# Patient Record
Sex: MALE | Race: WHITE | NOT HISPANIC OR LATINO | Employment: OTHER | URBAN - METROPOLITAN AREA
[De-identification: names, ages, dates, MRNs, and addresses within clinical notes are randomized per-mention and may not be internally consistent; named-entity substitution may affect disease eponyms.]

---

## 2017-01-03 RX ORDER — SIMVASTATIN 20 MG
TABLET ORAL
Qty: 90 TABLET | Refills: 1 | Status: SHIPPED | OUTPATIENT
Start: 2017-01-03 | End: 2017-08-21 | Stop reason: SDUPTHER

## 2017-01-03 RX ORDER — FINASTERIDE 5 MG/1
TABLET, FILM COATED ORAL
Qty: 90 TABLET | Refills: 1 | Status: SHIPPED | OUTPATIENT
Start: 2017-01-03 | End: 2017-08-21 | Stop reason: SDUPTHER

## 2017-04-24 DIAGNOSIS — K21.9 GASTROESOPHAGEAL REFLUX DISEASE, ESOPHAGITIS PRESENCE NOT SPECIFIED: ICD-10-CM

## 2017-04-24 DIAGNOSIS — E78.2 MIXED HYPERLIPIDEMIA: Primary | ICD-10-CM

## 2017-04-24 DIAGNOSIS — I10 BENIGN ESSENTIAL HYPERTENSION: Primary | ICD-10-CM

## 2017-04-24 RX ORDER — OMEPRAZOLE 20 MG/1
CAPSULE, DELAYED RELEASE ORAL
Qty: 90 CAPSULE | Refills: 0 | Status: SHIPPED | OUTPATIENT
Start: 2017-04-24 | End: 2017-08-21 | Stop reason: SDUPTHER

## 2017-04-24 RX ORDER — OMEPRAZOLE 20 MG/1
CAPSULE, DELAYED RELEASE ORAL
Qty: 90 CAPSULE | Refills: 0 | Status: SHIPPED | OUTPATIENT
Start: 2017-04-24 | End: 2017-04-24 | Stop reason: SDUPTHER

## 2017-04-24 RX ORDER — FENOFIBRATE 145 MG/1
TABLET, COATED ORAL
Qty: 90 TABLET | Refills: 0 | Status: SHIPPED | OUTPATIENT
Start: 2017-04-24 | End: 2017-08-21 | Stop reason: SDUPTHER

## 2017-04-24 RX ORDER — HYDROCHLOROTHIAZIDE 25 MG/1
TABLET ORAL
Qty: 90 TABLET | Refills: 0 | Status: SHIPPED | OUTPATIENT
Start: 2017-04-24 | End: 2018-12-11 | Stop reason: SDUPTHER

## 2017-06-08 ENCOUNTER — OFFICE VISIT (OUTPATIENT)
Dept: INTERNAL MEDICINE | Age: 67
End: 2017-06-08

## 2017-06-08 VITALS
DIASTOLIC BLOOD PRESSURE: 70 MMHG | HEART RATE: 72 BPM | WEIGHT: 170 LBS | SYSTOLIC BLOOD PRESSURE: 120 MMHG | BODY MASS INDEX: 26.68 KG/M2 | RESPIRATION RATE: 12 BRPM | HEIGHT: 67 IN

## 2017-06-08 DIAGNOSIS — I10 BENIGN ESSENTIAL HYPERTENSION: Primary | ICD-10-CM

## 2017-06-08 DIAGNOSIS — E78.2 MIXED HYPERLIPIDEMIA: ICD-10-CM

## 2017-06-08 LAB
ALBUMIN SERPL-MCNC: 4.6 G/DL (ref 3.5–5.2)
ALBUMIN/GLOB SERPL: 1.8 G/DL
ALP SERPL-CCNC: 32 U/L (ref 39–117)
ALT SERPL-CCNC: 23 U/L (ref 1–41)
AST SERPL-CCNC: 28 U/L (ref 1–40)
BILIRUB SERPL-MCNC: 0.3 MG/DL (ref 0.1–1.2)
BUN SERPL-MCNC: 21 MG/DL (ref 8–23)
BUN/CREAT SERPL: 16.2 (ref 7–25)
CALCIUM SERPL-MCNC: 10.6 MG/DL (ref 8.6–10.5)
CHLORIDE SERPL-SCNC: 101 MMOL/L (ref 98–107)
CHOLEST SERPL-MCNC: 164 MG/DL (ref 0–200)
CO2 SERPL-SCNC: 27.4 MMOL/L (ref 22–29)
CREAT SERPL-MCNC: 1.3 MG/DL (ref 0.76–1.27)
GLOBULIN SER CALC-MCNC: 2.6 GM/DL
GLUCOSE SERPL-MCNC: 102 MG/DL (ref 65–99)
HDLC SERPL-MCNC: 53 MG/DL (ref 40–60)
LDLC SERPL CALC-MCNC: 91 MG/DL (ref 0–100)
POTASSIUM SERPL-SCNC: 4.5 MMOL/L (ref 3.5–5.2)
PROT SERPL-MCNC: 7.2 G/DL (ref 6–8.5)
SODIUM SERPL-SCNC: 143 MMOL/L (ref 136–145)
TRIGL SERPL-MCNC: 102 MG/DL (ref 0–150)
VLDLC SERPL CALC-MCNC: 20.4 MG/DL (ref 5–40)

## 2017-06-08 PROCEDURE — 99214 OFFICE O/P EST MOD 30 MIN: CPT | Performed by: INTERNAL MEDICINE

## 2017-06-08 NOTE — PROGRESS NOTES
Juan F Sanz is a 67 y.o. male who presents with   Chief Complaint   Patient presents with   • Hypertension     Checkup; lab updates   • Hyperlipidemia     As above.  No complaints on today's visit.   .    Hypertension   This is a chronic problem. The current episode started more than 1 year ago. The problem is controlled. Past treatments include diuretics. The current treatment provides moderate improvement. There are no compliance problems.    Hyperlipidemia   This is a chronic problem. The current episode started more than 1 year ago. The problem is controlled. Recent lipid tests were reviewed and are normal. Current antihyperlipidemic treatment includes statins and fibric acid derivatives. The current treatment provides moderate improvement of lipids. There are no compliance problems.         The following portions of the patient's history were reviewed and updated as appropriate: allergies, current medications, past medical history and problem list.    Review of Systems   Constitutional: Negative.    HENT: Negative.    Eyes: Negative.    Respiratory: Negative.    Cardiovascular: Negative.    Genitourinary: Negative.    Musculoskeletal: Negative.    Skin: Negative.    Neurological: Negative.    Psychiatric/Behavioral: Negative.        Objective   Physical Exam   Constitutional: He is oriented to person, place, and time. He appears well-developed and well-nourished. No distress.   HENT:   Head: Normocephalic and atraumatic.   Eyes: Conjunctivae and EOM are normal. Pupils are equal, round, and reactive to light.   Neck: Normal range of motion. Neck supple. No thyromegaly present.   Neck exam negative.  Carotid auscultation normal-no bruits heard.   Cardiovascular: Normal rate, regular rhythm, normal heart sounds and intact distal pulses.  Exam reveals no gallop and no friction rub.    No murmur heard.  Pulmonary/Chest: Effort normal and breath sounds normal. No respiratory distress. He has no wheezes. He has  no rales. He exhibits no tenderness.   Neurological: He is alert and oriented to person, place, and time.   Psychiatric: He has a normal mood and affect. His behavior is normal. Judgment and thought content normal.   Nursing note and vitals reviewed.      Assessment/Plan   Juan F was seen today for hypertension and hyperlipidemia.    Diagnoses and all orders for this visit:    Benign essential hypertension  -     Comprehensive Metabolic Panel    Mixed hyperlipidemia  -     Comprehensive Metabolic Panel  -     Lipid Panel        Plan: Labs as above.Today's exam unremarkable for any new problems or events.  Continue all current treatment as prescribed.  A follow-up checkup/lab update visit is advised for 6 months.

## 2017-08-21 DIAGNOSIS — K21.9 GASTROESOPHAGEAL REFLUX DISEASE, ESOPHAGITIS PRESENCE NOT SPECIFIED: ICD-10-CM

## 2017-08-21 DIAGNOSIS — E78.2 MIXED HYPERLIPIDEMIA: ICD-10-CM

## 2017-08-21 RX ORDER — SIMVASTATIN 20 MG
TABLET ORAL
Qty: 90 TABLET | Refills: 1 | Status: SHIPPED | OUTPATIENT
Start: 2017-08-21 | End: 2018-03-06 | Stop reason: SDUPTHER

## 2017-08-21 RX ORDER — FINASTERIDE 5 MG/1
TABLET, FILM COATED ORAL
Qty: 90 TABLET | Refills: 1 | Status: SHIPPED | OUTPATIENT
Start: 2017-08-21 | End: 2018-03-06 | Stop reason: SDUPTHER

## 2017-08-21 RX ORDER — FENOFIBRATE 145 MG/1
TABLET, COATED ORAL
Qty: 90 TABLET | Refills: 1 | Status: SHIPPED | OUTPATIENT
Start: 2017-08-21 | End: 2018-03-06 | Stop reason: SDUPTHER

## 2017-08-21 RX ORDER — OMEPRAZOLE 20 MG/1
CAPSULE, DELAYED RELEASE ORAL
Qty: 90 CAPSULE | Refills: 0 | Status: SHIPPED | OUTPATIENT
Start: 2017-08-21 | End: 2017-10-31 | Stop reason: SDUPTHER

## 2017-10-05 ENCOUNTER — OFFICE VISIT (OUTPATIENT)
Dept: INTERNAL MEDICINE | Age: 67
End: 2017-10-05

## 2017-10-05 VITALS
WEIGHT: 165.4 LBS | TEMPERATURE: 98.3 F | DIASTOLIC BLOOD PRESSURE: 82 MMHG | BODY MASS INDEX: 25.96 KG/M2 | OXYGEN SATURATION: 99 % | HEART RATE: 66 BPM | HEIGHT: 67 IN | SYSTOLIC BLOOD PRESSURE: 132 MMHG

## 2017-10-05 DIAGNOSIS — R31.9 HEMATURIA, UNSPECIFIED TYPE: ICD-10-CM

## 2017-10-05 DIAGNOSIS — Z87.19 HISTORY OF DIVERTICULOSIS: ICD-10-CM

## 2017-10-05 DIAGNOSIS — R10.30 LOWER ABDOMINAL PAIN: Primary | ICD-10-CM

## 2017-10-05 LAB
BILIRUB BLD-MCNC: NEGATIVE MG/DL
CLARITY, POC: CLEAR
COLOR UR: YELLOW
GLUCOSE UR STRIP-MCNC: NEGATIVE MG/DL
KETONES UR QL: NEGATIVE
LEUKOCYTE EST, POC: NEGATIVE
NITRITE UR-MCNC: NEGATIVE MG/ML
PH UR: 5.5 [PH] (ref 5–8)
PROT UR STRIP-MCNC: NEGATIVE MG/DL
RBC # UR STRIP: ABNORMAL /UL
SP GR UR: 1.02 (ref 1–1.03)
UROBILINOGEN UR QL: NORMAL

## 2017-10-05 PROCEDURE — 81003 URINALYSIS AUTO W/O SCOPE: CPT | Performed by: NURSE PRACTITIONER

## 2017-10-05 PROCEDURE — 99214 OFFICE O/P EST MOD 30 MIN: CPT | Performed by: NURSE PRACTITIONER

## 2017-10-05 NOTE — PATIENT INSTRUCTIONS
Diverticulosis  Diverticulosis is the condition that develops when small pouches (diverticula) form in the wall of your colon. Your colon, or large intestine, is where water is absorbed and stool is formed. The pouches form when the inside layer of your colon pushes through weak spots in the outer layers of your colon.  CAUSES   No one knows exactly what causes diverticulosis.  RISK FACTORS  · Being older than 50. Your risk for this condition increases with age. Diverticulosis is rare in people younger than 40 years. By age 80, almost everyone has it.  · Eating a low-fiber diet.  · Being frequently constipated.  · Being overweight.  · Not getting enough exercise.  · Smoking.  · Taking over-the-counter pain medicines, like aspirin and ibuprofen.  SYMPTOMS   Most people with diverticulosis do not have symptoms.  DIAGNOSIS   Because diverticulosis often has no symptoms, health care providers often discover the condition during an exam for other colon problems. In many cases, a health care provider will diagnose diverticulosis while using a flexible scope to examine the colon (colonoscopy).  TREATMENT   If you have never developed an infection related to diverticulosis, you may not need treatment. If you have had an infection before, treatment may include:  · Eating more fruits, vegetables, and grains.  · Taking a fiber supplement.  · Taking a live bacteria supplement (probiotic).  · Taking medicine to relax your colon.  HOME CARE INSTRUCTIONS   · Drink at least 6-8 glasses of water each day to prevent constipation.  · Try not to strain when you have a bowel movement.  · Keep all follow-up appointments.  If you have had an infection before:   · Increase the fiber in your diet as directed by your health care provider or dietitian.  · Take a dietary fiber supplement if your health care provider approves.  · Only take medicines as directed by your health care provider.  SEEK MEDICAL CARE IF:   · You have abdominal  pain.  · You have bloating.  · You have cramps.  · You have not gone to the bathroom in 3 days.  SEEK IMMEDIATE MEDICAL CARE IF:   · Your pain gets worse.  · Your bloating becomes very bad.  · You have a fever or chills, and your symptoms suddenly get worse.  · You begin vomiting.  · You have bowel movements that are bloody or black.  MAKE SURE YOU:  · Understand these instructions.  · Will watch your condition.  · Will get help right away if you are not doing well or get worse.     This information is not intended to replace advice given to you by your health care provider. Make sure you discuss any questions you have with your health care provider.     Document Released: 09/14/2005 Document Revised: 12/23/2014 Document Reviewed: 11/12/2014  [employer]vier Interactive Patient Education ©2017 [employer]

## 2017-10-05 NOTE — PROGRESS NOTES
Subjective   Juan F Sanz is a 67 y.o. male.     Abdominal Pain   This is a new problem. The current episode started 1 to 4 weeks ago (had discomfort x 1 week with fever, returned 3 days ago). The problem occurs intermittently. The pain is located in the suprapubic region. The pain is at a severity of 2/10. The pain is mild. The quality of the pain is sharp. The abdominal pain does not radiate. Associated symptoms include nausea (mild). Pertinent negatives include no constipation (last BM this AM), diarrhea, dysuria, fever, flatus, frequency, hematochezia, hematuria, melena or vomiting. Nothing aggravates the pain. The pain is relieved by nothing. Treatments tried: NSAID. The treatment provided mild relief. His past medical history is significant for GERD. There is no history of abdominal surgery, colon cancer, gallstones, irritable bowel syndrome, PUD or ulcerative colitis. last colonoscopy (2013), noted to have hemorrhoids or diverticuli    Patient denies any urinary symptoms out of the ordinary, positive medical history for BPH, currently on Proscar.    The following portions of the patient's history were reviewed and updated as appropriate: allergies, current medications, past family history, past medical history, past social history, past surgical history and problem list.    Review of Systems   Constitutional: Negative for chills and fever.   Gastrointestinal: Positive for abdominal pain and nausea (mild). Negative for abdominal distention, blood in stool, constipation (last BM this AM), diarrhea, flatus, hematochezia, melena and vomiting.   Genitourinary: Negative for decreased urine volume, difficulty urinating, dysuria, enuresis, flank pain, frequency, hematuria, testicular pain and urgency.       Objective   Physical Exam   Constitutional: Vital signs are normal. He appears well-developed and well-nourished. He is cooperative. He does not appear ill. No distress.   Cardiovascular: Normal rate, regular  rhythm and normal heart sounds.    No murmur heard.  Pulmonary/Chest: Effort normal and breath sounds normal.   Abdominal: Soft. Normal appearance and bowel sounds are normal. He exhibits no distension. There is tenderness in the suprapubic area. There is no CVA tenderness.   Neurological: He is alert.   Skin: Skin is warm, dry and intact.   Psychiatric: He has a normal mood and affect. His speech is normal and behavior is normal. Judgment and thought content normal. Cognition and memory are normal.   Nursing note and vitals reviewed.      Assessment/Plan   Problems Addressed this Visit     None      Visit Diagnoses     Lower abdominal pain    -  Primary    Relevant Orders    POC Urinalysis Dipstick, Automated (Completed)    CT Abdomen Pelvis With Contrast    CBC & Differential    Comprehensive Metabolic Panel    Urine Culture - Urine, Urine, Clean Catch    History of diverticulosis        Relevant Orders    CT Abdomen Pelvis With Contrast    Hematuria, unspecified type        Relevant Orders    Urine Culture - Urine, Urine, Clean Catch        1. Lower abdominal pain  Patient with intermittent complaint of lower abdominal pain localized to the suprapubic area x a period of weeks.  The pain is associated with mild nausea, no changes in bowel movement pattern.  Patient does have a history of diverticulosis.  Point-of-care urinalysis nonremarkable other than small amount of hematuria.  I will obtain CT of abdomen and pelvis in addition to CBC and CMP to r/o diverticulosis, nephrolithiasis.      - POC Urinalysis Dipstick, Automated  - CT Abdomen Pelvis With Contrast  - CBC & Differential  - Comprehensive Metabolic Panel  - Urine Culture - Urine, Urine, Clean Catch    2. History of diverticulosis    - CT Abdomen Pelvis With Contrast    3. Hematuria, unspecified type    - Urine Culture - Urine, Urine, Clean Catch

## 2017-10-07 LAB
ALBUMIN SERPL-MCNC: 4.3 G/DL (ref 3.5–5.2)
ALBUMIN/GLOB SERPL: 1.7 G/DL
ALP SERPL-CCNC: 37 U/L (ref 39–117)
ALT SERPL-CCNC: 19 U/L (ref 1–41)
AST SERPL-CCNC: 34 U/L (ref 1–40)
BACTERIA UR CULT: NO GROWTH
BACTERIA UR CULT: NORMAL
BASOPHILS # BLD AUTO: 0.03 10*3/MM3 (ref 0–0.2)
BASOPHILS NFR BLD AUTO: 0.4 % (ref 0–1.5)
BILIRUB SERPL-MCNC: 0.8 MG/DL (ref 0.1–1.2)
BUN SERPL-MCNC: 20 MG/DL (ref 8–23)
BUN/CREAT SERPL: 15.4 (ref 7–25)
CALCIUM SERPL-MCNC: 10.1 MG/DL (ref 8.6–10.5)
CHLORIDE SERPL-SCNC: 101 MMOL/L (ref 98–107)
CO2 SERPL-SCNC: 27.6 MMOL/L (ref 22–29)
CREAT SERPL-MCNC: 1.3 MG/DL (ref 0.76–1.27)
EOSINOPHIL # BLD AUTO: 0.08 10*3/MM3 (ref 0–0.7)
EOSINOPHIL NFR BLD AUTO: 1 % (ref 0.3–6.2)
ERYTHROCYTE [DISTWIDTH] IN BLOOD BY AUTOMATED COUNT: 13 % (ref 11.5–14.5)
GLOBULIN SER CALC-MCNC: 2.6 GM/DL
GLUCOSE SERPL-MCNC: 97 MG/DL (ref 65–99)
HCT VFR BLD AUTO: 41.5 % (ref 40.4–52.2)
HGB BLD-MCNC: 13.2 G/DL (ref 13.7–17.6)
IMM GRANULOCYTES # BLD: 0 10*3/MM3 (ref 0–0.03)
IMM GRANULOCYTES NFR BLD: 0 % (ref 0–0.5)
LYMPHOCYTES # BLD AUTO: 1.07 10*3/MM3 (ref 0.9–4.8)
LYMPHOCYTES NFR BLD AUTO: 13.9 % (ref 19.6–45.3)
MCH RBC QN AUTO: 29.5 PG (ref 27–32.7)
MCHC RBC AUTO-ENTMCNC: 31.8 G/DL (ref 32.6–36.4)
MCV RBC AUTO: 92.6 FL (ref 79.8–96.2)
MONOCYTES # BLD AUTO: 0.56 10*3/MM3 (ref 0.2–1.2)
MONOCYTES NFR BLD AUTO: 7.3 % (ref 5–12)
NEUTROPHILS # BLD AUTO: 5.98 10*3/MM3 (ref 1.9–8.1)
NEUTROPHILS NFR BLD AUTO: 77.4 % (ref 42.7–76)
PLATELET # BLD AUTO: 259 10*3/MM3 (ref 140–500)
POTASSIUM SERPL-SCNC: 4.6 MMOL/L (ref 3.5–5.2)
PROT SERPL-MCNC: 6.9 G/DL (ref 6–8.5)
RBC # BLD AUTO: 4.48 10*6/MM3 (ref 4.6–6)
SODIUM SERPL-SCNC: 143 MMOL/L (ref 136–145)
WBC # BLD AUTO: 7.72 10*3/MM3 (ref 4.5–10.7)

## 2017-10-13 ENCOUNTER — HOSPITAL ENCOUNTER (OUTPATIENT)
Dept: CT IMAGING | Facility: HOSPITAL | Age: 67
Discharge: HOME | End: 2017-10-13
Admitting: NURSE PRACTITIONER

## 2017-10-13 ENCOUNTER — TELEPHONE (OUTPATIENT)
Dept: INTERNAL MEDICINE | Age: 67
End: 2017-10-13

## 2017-10-13 DIAGNOSIS — K22.89 ESOPHAGEAL THICKENING: ICD-10-CM

## 2017-10-13 DIAGNOSIS — R93.89 ABNORMAL FINDING ON CT SCAN: ICD-10-CM

## 2017-10-13 DIAGNOSIS — K57.92 ACUTE DIVERTICULITIS: Primary | ICD-10-CM

## 2017-10-13 DIAGNOSIS — N40.0 ENLARGED PROSTATE: ICD-10-CM

## 2017-10-13 LAB — CREAT BLDA-MCNC: 1.2 MG/DL (ref 0.6–1.3)

## 2017-10-13 PROCEDURE — 0 IOPAMIDOL 61 % SOLUTION: Performed by: NURSE PRACTITIONER

## 2017-10-13 PROCEDURE — 82565 ASSAY OF CREATININE: CPT

## 2017-10-13 PROCEDURE — 74177 CT ABD & PELVIS W/CONTRAST: CPT

## 2017-10-13 RX ORDER — CIPROFLOXACIN 500 MG/1
500 TABLET, FILM COATED ORAL 2 TIMES DAILY
Qty: 14 TABLET | Refills: 0 | Status: SHIPPED | OUTPATIENT
Start: 2017-10-13 | End: 2017-10-20

## 2017-10-13 RX ORDER — METRONIDAZOLE 500 MG/1
500 TABLET ORAL EVERY 6 HOURS
Qty: 28 TABLET | Refills: 0 | Status: SHIPPED | OUTPATIENT
Start: 2017-10-13 | End: 2017-10-20

## 2017-10-13 RX ADMIN — IOPAMIDOL 85 ML: 612 INJECTION, SOLUTION INTRAVENOUS at 11:15

## 2017-10-13 NOTE — TELEPHONE ENCOUNTER
I discussed results of abdominal CT scan with patient.  He reports abdominal pain has not worsened, still has some mild tenderness with palpation, denies any vomiting, diarrhea, fever, or severe pain.  Discussed with patient that it looks like he has some mild diverticulitis, appropriate to treat outpatient with course of antibiotics.  I also discussed incidental findings noted by the radiologist of the significantly enlarged prostate and the bladder wall thickening from likely urinary obstruction as well as the esophageal thickening.  He does have a history of BPH, currently on Pro scar per Dr. Acosta.  He does report he has seen a urologist in the past, although it has been some years (Dr. James Mcgregor).  He has also seen GI in the past, although it has been approximately 10 years or so since he has had endoscopic.  He does have a history of acid reflux.  Discussed with patient that at this time, per the radiologist's recommendations, I'm going to place referrals to both Dr. Torres with GI as well as his previous urologist.  He is also going to follow-up with Dr. Martinez next week.

## 2017-10-17 ENCOUNTER — OFFICE VISIT (OUTPATIENT)
Dept: INTERNAL MEDICINE | Age: 67
End: 2017-10-17

## 2017-10-17 VITALS
TEMPERATURE: 97.4 F | RESPIRATION RATE: 12 BRPM | HEART RATE: 70 BPM | SYSTOLIC BLOOD PRESSURE: 148 MMHG | HEIGHT: 67 IN | OXYGEN SATURATION: 96 % | WEIGHT: 168.2 LBS | DIASTOLIC BLOOD PRESSURE: 70 MMHG | BODY MASS INDEX: 26.4 KG/M2

## 2017-10-17 DIAGNOSIS — K57.32 DIVERTICULITIS OF LARGE INTESTINE WITHOUT PERFORATION OR ABSCESS WITHOUT BLEEDING: Primary | ICD-10-CM

## 2017-10-17 DIAGNOSIS — R35.1 BPH ASSOCIATED WITH NOCTURIA: ICD-10-CM

## 2017-10-17 DIAGNOSIS — I10 BENIGN ESSENTIAL HYPERTENSION: ICD-10-CM

## 2017-10-17 DIAGNOSIS — N40.1 BPH ASSOCIATED WITH NOCTURIA: ICD-10-CM

## 2017-10-17 PROBLEM — N40.0 BPH WITHOUT URINARY OBSTRUCTION: Status: ACTIVE | Noted: 2017-10-17

## 2017-10-17 PROCEDURE — 99214 OFFICE O/P EST MOD 30 MIN: CPT | Performed by: INTERNAL MEDICINE

## 2017-10-17 NOTE — PROGRESS NOTES
Juan F Sanz is a 67 y.o. male who presents with   Chief Complaint   Patient presents with   • Diverticulitis     Recently was seen by the nurse practitioner.  Treated with Cipro and Flagyl.  Returns today for follow-up to discuss recent CT of the abdomen and pelvis   • BPH with nocturia     As above.  Patient also having some dribbling with urination.  No fever, chills or dysuria.   • Hypertension     Check blood pressure.   .    Diverticulitis   This is a new problem. The current episode started 1 to 4 weeks ago. The problem has been rapidly improving. Pertinent negatives include no abdominal pain, chills, fever, nausea or vomiting. Treatments tried: Cipro/Flagyl as prescribed. The treatment provided moderate relief.   Hypertension   This is a chronic problem. The current episode started more than 1 year ago. The problem has been waxing and waning since onset. The problem is controlled. Past treatments include diuretics. The current treatment provides mild improvement. There are no compliance problems.       BPH: History as outlined above.  Recent CT of the abdomen/pelvis shows enlargement of the prostate.      The following portions of the patient's history were reviewed and updated as appropriate: allergies, current medications, past medical history and problem list.    Review of Systems   Constitutional: Negative.  Negative for chills and fever.   HENT: Negative.    Eyes: Negative.    Respiratory: Negative.    Cardiovascular: Negative.    Gastrointestinal: Negative for abdominal pain, nausea and vomiting.   Genitourinary: Positive for decreased urine volume and difficulty urinating.   Musculoskeletal: Negative.    Skin: Negative.    Neurological: Negative.    Psychiatric/Behavioral: Negative.        Objective   Physical Exam   Constitutional: He is oriented to person, place, and time. He appears well-developed and well-nourished. No distress.   HENT:   Head: Normocephalic and atraumatic.   Eyes:  Conjunctivae and EOM are normal. Pupils are equal, round, and reactive to light.   Neck: Normal range of motion. Neck supple. No thyromegaly present.   Neck exam negative.  Carotid auscultation normal-no bruits heard.   Cardiovascular: Normal rate, regular rhythm, normal heart sounds and intact distal pulses.  Exam reveals no gallop and no friction rub.    No murmur heard.  Pulmonary/Chest: Effort normal and breath sounds normal. No respiratory distress. He has no wheezes. He has no rales. He exhibits no tenderness.   Abdominal: Soft. Bowel sounds are normal. He exhibits no distension and no mass. There is no tenderness. There is no rebound and no guarding.   Genitourinary:   Genitourinary Comments:  exam deferred.  Will make referral to urology-Dr. Le.   Neurological: He is alert and oriented to person, place, and time.   Psychiatric: He has a normal mood and affect. His behavior is normal. Judgment and thought content normal.   Nursing note and vitals reviewed.      Assessment/Plan   Juan F was seen today for diverticulitis, bph with nocturia and hypertension.    Diagnoses and all orders for this visit:    Diverticulitis of large intestine without perforation or abscess without bleeding    BPH associated with nocturia  -     Ambulatory Referral to Urology    Benign essential hypertension      Plan:  #1 diverticulitis: CT scan of the abdomen/pelvis reviewed.  Findings compatible with diverticulitis of the sigmoid colon.  Patient is symptomatically improved on today's visit.  Finish out prescribed Cipro and Flagyl.  Follow-up when necessary.  If problems persist may need referral back to his gastroenterologist-Dr. Torres.    #2 BPH/nocturia/dribbling: CT scan of the abdomen and pelvis done on October 5 reviewed showing enlargement of the medial lobe of the prostate.  We will make urology referral to Dr. Mitul Le for further evaluation.    #3: Patient with a history of GERD.  Review the CT scan of the  "abdomen as above shows what appears to be lower esophageal esophagitis but the patient is asymptomatic and is on omeprazole.  He chooses not to follow-up with Dr. Torres at this time but will continue omeprazole.    #4 hypertension: Blood pressure under good control but seems to be on an upward trend.  His blood pressure at home he says it is consistently \"around 120/70\"..  Continue current treatment.  We'll see him in December for his six-month checkup/lab update visit and we will reevaluate his BP then.         "

## 2017-10-31 DIAGNOSIS — K21.9 GASTROESOPHAGEAL REFLUX DISEASE, ESOPHAGITIS PRESENCE NOT SPECIFIED: ICD-10-CM

## 2017-11-01 RX ORDER — OMEPRAZOLE 20 MG/1
CAPSULE, DELAYED RELEASE ORAL
Qty: 90 CAPSULE | Refills: 0 | Status: SHIPPED | OUTPATIENT
Start: 2017-11-01 | End: 2018-03-06 | Stop reason: SDUPTHER

## 2017-11-09 ENCOUNTER — OFFICE VISIT (OUTPATIENT)
Dept: GASTROENTEROLOGY | Facility: CLINIC | Age: 67
End: 2017-11-09

## 2017-11-09 VITALS
HEIGHT: 67 IN | WEIGHT: 170 LBS | HEART RATE: 63 BPM | DIASTOLIC BLOOD PRESSURE: 88 MMHG | SYSTOLIC BLOOD PRESSURE: 150 MMHG | BODY MASS INDEX: 26.68 KG/M2

## 2017-11-09 DIAGNOSIS — R85.4 ABNORMAL IMMUNOLOGICAL FINDINGS IN SPECIMENS FROM DIGESTIVE ORGANS AND ABDOMINAL CAVITY: Primary | ICD-10-CM

## 2017-11-09 DIAGNOSIS — K57.32 DIVERTICULITIS OF LARGE INTESTINE WITHOUT PERFORATION OR ABSCESS WITHOUT BLEEDING: ICD-10-CM

## 2017-11-09 PROCEDURE — 99214 OFFICE O/P EST MOD 30 MIN: CPT | Performed by: INTERNAL MEDICINE

## 2017-11-09 RX ORDER — MELATONIN
1000 DAILY
COMMUNITY

## 2017-11-09 RX ORDER — SODIUM CHLORIDE, SODIUM LACTATE, POTASSIUM CHLORIDE, CALCIUM CHLORIDE 600; 310; 30; 20 MG/100ML; MG/100ML; MG/100ML; MG/100ML
30 INJECTION, SOLUTION INTRAVENOUS CONTINUOUS
Status: CANCELLED | OUTPATIENT
Start: 2017-12-07

## 2017-11-09 RX ORDER — TAMSULOSIN HYDROCHLORIDE 0.4 MG/1
CAPSULE ORAL
Refills: 3 | COMMUNITY
Start: 2017-11-07

## 2017-11-09 NOTE — PROGRESS NOTES
Subjective   Juan F Sanz is a 67 y.o. male is here today for follow-up.  Chief Complaint   Patient presents with   • Difficulty Swallowing   • Heartburn   • Abnormal Imaging   • Abdominal Pain     Diverticulitis       Abdominal Pain   This is a new problem. The current episode started more than 1 month ago. The onset quality is gradual. The problem occurs intermittently. The most recent episode lasted 1 hours. The problem has been gradually improving. The pain is located in the suprapubic region. The pain is at a severity of 7/10. The quality of the pain is sharp. The abdominal pain does not radiate. Associated symptoms include frequency. Pertinent negatives include no anorexia, arthralgias, belching, constipation, diarrhea, dysuria, fever, flatus, headaches, hematochezia, hematuria, melena, myalgias, nausea, vomiting or weight loss. Nothing aggravates the pain. The pain is relieved by bowel movements. Prior diagnostic workup includes CT scan.     The patient feels well now but part of his evaluation for the above symptoms was a CT scan dated 10/13/2017.  This demonstrated both circumferential thickening of the distal esophagus and an area circumferential thickening of the sigmoid colon.  He was referred for evaluation of both of these areas.  The following portions of the patient's history were reviewed and updated as appropriate: allergies, current medications, past family history, past medical history, past social history, past surgical history and problem list.    Review of Systems   Constitutional: Negative for fever and weight loss.   Gastrointestinal: Positive for abdominal pain. Negative for anorexia, constipation, diarrhea, flatus, hematochezia, melena, nausea and vomiting.   Genitourinary: Positive for frequency. Negative for dysuria and hematuria.   Musculoskeletal: Negative for arthralgias and myalgias.   Neurological: Negative for headaches.       Objective   Physical Exam   Constitutional: He  appears well-developed and well-nourished.   Nursing note and vitals reviewed.    The patient has a palpable sigmoid cord which is only minimally tender.  The remainder is abdominal examination is benign.  There is no hepatosplenomegaly.    Assessment/Plan   Problems Addressed this Visit        Digestive    Diverticulitis of large intestine without perforation or abscess without bleeding       Other    Abnormal immunological findings in specimens from digestive organs and abdominal cavity - Primary    Relevant Orders    Case Request (Completed)        The patient and I reviewed his CT scan together.  The thickening of the distal esophagus is very easy to see on the scan, in the sigmoid colon is not so readily identifiable.  His last colonoscopy was 2012, last endoscopy was 2004, so it would be appropriate to update to 2 of those examinations.

## 2017-12-07 ENCOUNTER — HOSPITAL ENCOUNTER (OUTPATIENT)
Facility: HOSPITAL | Age: 67
Setting detail: HOSPITAL OUTPATIENT SURGERY
Discharge: HOME | End: 2017-12-07
Attending: INTERNAL MEDICINE | Admitting: INTERNAL MEDICINE

## 2017-12-07 ENCOUNTER — ANESTHESIA EVENT (OUTPATIENT)
Dept: GASTROENTEROLOGY | Facility: HOSPITAL | Age: 67
End: 2017-12-07

## 2017-12-07 ENCOUNTER — ANESTHESIA (OUTPATIENT)
Dept: GASTROENTEROLOGY | Facility: HOSPITAL | Age: 67
End: 2017-12-07

## 2017-12-07 VITALS
HEIGHT: 67 IN | HEART RATE: 47 BPM | RESPIRATION RATE: 16 BRPM | DIASTOLIC BLOOD PRESSURE: 85 MMHG | OXYGEN SATURATION: 99 % | SYSTOLIC BLOOD PRESSURE: 146 MMHG | TEMPERATURE: 98.2 F | BODY MASS INDEX: 26.37 KG/M2 | WEIGHT: 168 LBS

## 2017-12-07 DIAGNOSIS — R85.4 ABNORMAL IMMUNOLOGICAL FINDINGS IN SPECIMENS FROM DIGESTIVE ORGANS AND ABDOMINAL CAVITY: ICD-10-CM

## 2017-12-07 PROCEDURE — 25010000002 PROPOFOL 10 MG/ML EMULSION: Performed by: ANESTHESIOLOGY

## 2017-12-07 PROCEDURE — 43236 UPPR GI SCOPE W/SUBMUC INJ: CPT | Performed by: INTERNAL MEDICINE

## 2017-12-07 PROCEDURE — 45378 DIAGNOSTIC COLONOSCOPY: CPT | Performed by: INTERNAL MEDICINE

## 2017-12-07 RX ORDER — SODIUM CHLORIDE, SODIUM LACTATE, POTASSIUM CHLORIDE, CALCIUM CHLORIDE 600; 310; 30; 20 MG/100ML; MG/100ML; MG/100ML; MG/100ML
30 INJECTION, SOLUTION INTRAVENOUS CONTINUOUS
Status: DISCONTINUED | OUTPATIENT
Start: 2017-12-07 | End: 2017-12-07 | Stop reason: HOSPADM

## 2017-12-07 RX ORDER — PROPOFOL 10 MG/ML
VIAL (ML) INTRAVENOUS AS NEEDED
Status: DISCONTINUED | OUTPATIENT
Start: 2017-12-07 | End: 2017-12-07 | Stop reason: SURG

## 2017-12-07 RX ORDER — PROPOFOL 10 MG/ML
VIAL (ML) INTRAVENOUS CONTINUOUS PRN
Status: DISCONTINUED | OUTPATIENT
Start: 2017-12-07 | End: 2017-12-07 | Stop reason: SURG

## 2017-12-07 RX ORDER — LIDOCAINE HYDROCHLORIDE 20 MG/ML
INJECTION, SOLUTION INFILTRATION; PERINEURAL AS NEEDED
Status: DISCONTINUED | OUTPATIENT
Start: 2017-12-07 | End: 2017-12-07 | Stop reason: SURG

## 2017-12-07 RX ORDER — SODIUM CHLORIDE, SODIUM LACTATE, POTASSIUM CHLORIDE, CALCIUM CHLORIDE 600; 310; 30; 20 MG/100ML; MG/100ML; MG/100ML; MG/100ML
30 INJECTION, SOLUTION INTRAVENOUS CONTINUOUS PRN
Status: DISCONTINUED | OUTPATIENT
Start: 2017-12-07 | End: 2017-12-07 | Stop reason: HOSPADM

## 2017-12-07 RX ADMIN — SODIUM CHLORIDE, POTASSIUM CHLORIDE, SODIUM LACTATE AND CALCIUM CHLORIDE 30 ML/HR: 600; 310; 30; 20 INJECTION, SOLUTION INTRAVENOUS at 10:42

## 2017-12-07 RX ADMIN — PROPOFOL 140 MCG/KG/MIN: 10 INJECTION, EMULSION INTRAVENOUS at 11:40

## 2017-12-07 RX ADMIN — PROPOFOL 140 MG: 10 INJECTION, EMULSION INTRAVENOUS at 11:38

## 2017-12-07 RX ADMIN — LIDOCAINE HYDROCHLORIDE 50 MG: 20 INJECTION, SOLUTION INFILTRATION; PERINEURAL at 11:38

## 2017-12-07 NOTE — PLAN OF CARE
Problem: Patient Care Overview (Adult)  Goal: Adult Individualization and Mutuality  Outcome: Ongoing (interventions implemented as appropriate)    12/07/17 1019   Individualization   Patient Specific Preferences Juan F       Goal: Discharge Needs Assessment  Outcome: Ongoing (interventions implemented as appropriate)    12/07/17 1019   Discharge Needs Assessment   Concerns To Be Addressed no discharge needs identified         Problem: GI Endoscopy (Adult)  Goal: Signs and Symptoms of Listed Potential Problems Will be Absent or Manageable (GI Endoscopy)  Outcome: Ongoing (interventions implemented as appropriate)    12/07/17 1019   GI Endoscopy   Problems Assessed (GI Endoscopy) all   Problems Present (GI Endoscopy) none

## 2017-12-07 NOTE — ANESTHESIA PREPROCEDURE EVALUATION
Anesthesia Evaluation     Patient summary reviewed and Nursing notes reviewed   no history of anesthetic complications:  NPO Solid Status: > 8 hours  NPO Liquid Status: > 6 hours     Airway   Mallampati: II  TM distance: <3 FB  Neck ROM: full  possible difficult intubation  Dental - normal exam     Pulmonary - negative pulmonary ROS and normal exam    breath sounds clear to auscultation  Cardiovascular - normal exam    Rhythm: regular  Rate: normal    (+) hypertension, hyperlipidemia      Neuro/Psych- negative ROS  GI/Hepatic/Renal/Endo    (+)  GERD,     ROS Comment: Hx diverticulitis    Musculoskeletal     Abdominal  - normal exam   Substance History - negative use     OB/GYN negative ob/gyn ROS         Other   (+) arthritis                                   Anesthesia Plan    ASA 2     MAC     intravenous induction   Anesthetic plan and risks discussed with patient.

## 2017-12-07 NOTE — ANESTHESIA POSTPROCEDURE EVALUATION
Patient: Juan F Sanz    Procedure Summary     Date Anesthesia Start Anesthesia Stop Room / Location    12/07/17 1134 1204  [facility] /  [facility]       Procedure Diagnosis Surgeon Provider    ESOPHAGOGASTRODUODENOSCOPY (N/A Esophagus); COLONOSCOPY (N/A ) Abnormal immunological findings in specimens from digestive organs and abdominal cavity  (Abnormal immunological findings in specimens from digestive organs and abdominal cavity [R85.4]) MD Martha Renner MD          Anesthesia Type: MAC  Last vitals  BP   146/85 (12/07/17 1225)   Temp   36.8 °C (98.2 °F) (12/07/17 1212)   Pulse   (!) 47 (12/07/17 1225)   Resp   16 (12/07/17 1225)     SpO2   99 % (12/07/17 1225)     Post Anesthesia Care and Evaluation    Patient location during evaluation: PHASE II  Patient participation: complete - patient participated  Level of consciousness: awake and alert  Pain management: adequate  Airway patency: patent  Anesthetic complications: No anesthetic complications  PONV Status: none  Cardiovascular status: acceptable  Respiratory status: acceptable  Hydration status: acceptable

## 2018-01-25 ENCOUNTER — OFFICE VISIT (OUTPATIENT)
Dept: INTERNAL MEDICINE | Age: 68
End: 2018-01-25

## 2018-01-25 VITALS
TEMPERATURE: 95.6 F | OXYGEN SATURATION: 99 % | HEART RATE: 60 BPM | BODY MASS INDEX: 27.15 KG/M2 | DIASTOLIC BLOOD PRESSURE: 80 MMHG | WEIGHT: 173 LBS | SYSTOLIC BLOOD PRESSURE: 136 MMHG | HEIGHT: 67 IN | RESPIRATION RATE: 12 BRPM

## 2018-01-25 DIAGNOSIS — E78.2 MIXED HYPERLIPIDEMIA: ICD-10-CM

## 2018-01-25 DIAGNOSIS — I10 BENIGN ESSENTIAL HYPERTENSION: Primary | ICD-10-CM

## 2018-01-25 DIAGNOSIS — R06.83 SNORING: ICD-10-CM

## 2018-01-25 LAB
ALBUMIN SERPL-MCNC: 4.1 G/DL (ref 3.5–5.2)
ALBUMIN/GLOB SERPL: 1.6 G/DL
ALP SERPL-CCNC: 34 U/L (ref 39–117)
ALT SERPL-CCNC: 14 U/L (ref 1–41)
AST SERPL-CCNC: 20 U/L (ref 1–40)
BILIRUB SERPL-MCNC: 0.3 MG/DL (ref 0.1–1.2)
BUN SERPL-MCNC: 24 MG/DL (ref 8–23)
BUN/CREAT SERPL: 20.9 (ref 7–25)
CALCIUM SERPL-MCNC: 9.5 MG/DL (ref 8.6–10.5)
CHLORIDE SERPL-SCNC: 104 MMOL/L (ref 98–107)
CHOLEST SERPL-MCNC: 149 MG/DL (ref 0–200)
CO2 SERPL-SCNC: 25.1 MMOL/L (ref 22–29)
CREAT SERPL-MCNC: 1.15 MG/DL (ref 0.76–1.27)
GFR SERPLBLD CREATININE-BSD FMLA CKD-EPI: 63 ML/MIN/1.73
GFR SERPLBLD CREATININE-BSD FMLA CKD-EPI: 77 ML/MIN/1.73
GLOBULIN SER CALC-MCNC: 2.5 GM/DL
GLUCOSE SERPL-MCNC: 96 MG/DL (ref 65–99)
HDLC SERPL-MCNC: 49 MG/DL (ref 40–60)
LDLC SERPL CALC-MCNC: 83 MG/DL (ref 0–100)
POTASSIUM SERPL-SCNC: 3.9 MMOL/L (ref 3.5–5.2)
PROT SERPL-MCNC: 6.6 G/DL (ref 6–8.5)
SODIUM SERPL-SCNC: 142 MMOL/L (ref 136–145)
T4 FREE SERPL-MCNC: 1.04 NG/DL (ref 0.93–1.7)
TRIGL SERPL-MCNC: 87 MG/DL (ref 0–150)
TSH SERPL DL<=0.005 MIU/L-ACNC: 1.73 MIU/ML (ref 0.27–4.2)
VLDLC SERPL CALC-MCNC: 17.4 MG/DL (ref 5–40)

## 2018-01-25 PROCEDURE — 99214 OFFICE O/P EST MOD 30 MIN: CPT | Performed by: INTERNAL MEDICINE

## 2018-01-25 NOTE — PROGRESS NOTES
"  Juna F Sanz is a 67 y.o. male who presents with   Chief Complaint   Patient presents with   • Hypertension     Checkup; lab updates   • Hyperlipidemia     As above   • Snoring     Unrelated reason for today's visit: Patient says his wife wanted him to mention the fact that he \"snores a lot\".  He denies any knowledge of apneic episodes during his sleep.  He also says that if he gets \"7 or 8 hours of sleep a night I feel rested\"   .    Hypertension   This is a chronic problem. The current episode started more than 1 year ago. The problem is controlled. Past treatments include diuretics. The current treatment provides moderate improvement. There are no compliance problems (With medication).    Hyperlipidemia   This is a chronic problem. The current episode started more than 1 year ago. The problem is controlled. Current antihyperlipidemic treatment includes statins and fibric acid derivatives. The current treatment provides moderate improvement of lipids. There are no compliance problems (With medication).    Snoring   This is a chronic problem. The current episode started more than 1 month ago. The problem has been unchanged. Nothing aggravates the symptoms. He has tried nothing for the symptoms.        The following portions of the patient's history were reviewed and updated as appropriate: allergies, current medications, past medical history and problem list.    Review of Systems   Constitutional: Negative.    HENT: Negative.    Eyes: Negative.    Respiratory: Positive for snoring.    Cardiovascular: Negative.    Genitourinary: Negative.    Musculoskeletal: Negative.    Skin: Negative.    Neurological: Negative.    Psychiatric/Behavioral: Negative.        Objective   Physical Exam   Constitutional: He is oriented to person, place, and time. He appears well-developed and well-nourished. No distress.   HENT:   Head: Normocephalic and atraumatic.   Eyes: Conjunctivae and EOM are normal. Pupils are equal, round, " and reactive to light.   Neck: Normal range of motion. Neck supple. No thyromegaly present.   Neck exam negative.  Carotid auscultation normal-no bruits heard.   Cardiovascular: Normal rate, regular rhythm, normal heart sounds and intact distal pulses.  Exam reveals no gallop and no friction rub.    No murmur heard.  Pulmonary/Chest: Effort normal and breath sounds normal. No respiratory distress. He has no wheezes. He has no rales. He exhibits no tenderness.   Neurological: He is alert and oriented to person, place, and time.   Psychiatric: He has a normal mood and affect. His behavior is normal. Judgment and thought content normal.   Nursing note and vitals reviewed.      Assessment/Plan   Juan F was seen today for hypertension, hyperlipidemia and snoring.    Diagnoses and all orders for this visit:    Benign essential hypertension  -     Comprehensive Metabolic Panel  -     TSH+Free T4    Mixed hyperlipidemia  -     Comprehensive Metabolic Panel  -     Lipid Panel  -     TSH+Free T4    Snoring  -     Ambulatory Referral to Sleep Medicine        Plan: Labs as above.Today's exam unremarkable for any new problems or events.  Continue all current treatment as prescribed.  A follow-up checkup/lab update visit is advised for 6 months.    Also noted on today's exam, the patient was seen in October by our nurse practitioner for lower abdominal pain.  It was felt that he had possible diverticulitis and a CT scan of the abdomen and pelvis were performed.  This was reviewed with the patient and the findings showed colonic diverticulosis and some suggestion of sigmoid diverticulitis/colitis.  There was also diffuse thickening of the urinary bladder with suggestion of a polypoid mass within the midline with some question of whether this was prostate or not.  There also was some thickening of the distal esophagus.  The patient was subsequently seen by gastroenterology-Dr. Torres who performed EGD and colonoscopy with negative  findings according to the patient.  He was also seen by urology-Dr. Kay who performed cystoscopy with negative findings according to the patient.

## 2018-03-06 ENCOUNTER — TELEPHONE (OUTPATIENT)
Dept: INTERNAL MEDICINE | Age: 68
End: 2018-03-06

## 2018-03-06 DIAGNOSIS — K21.9 GASTROESOPHAGEAL REFLUX DISEASE, ESOPHAGITIS PRESENCE NOT SPECIFIED: ICD-10-CM

## 2018-03-06 DIAGNOSIS — E78.2 MIXED HYPERLIPIDEMIA: ICD-10-CM

## 2018-03-06 DIAGNOSIS — N40.0 BPH WITHOUT URINARY OBSTRUCTION: Primary | ICD-10-CM

## 2018-03-06 RX ORDER — FENOFIBRATE 145 MG/1
145 TABLET, COATED ORAL DAILY
Qty: 90 TABLET | Refills: 1 | Status: SHIPPED | OUTPATIENT
Start: 2018-03-06 | End: 2018-09-10 | Stop reason: SDUPTHER

## 2018-03-06 RX ORDER — FINASTERIDE 5 MG/1
5 TABLET, FILM COATED ORAL DAILY
Qty: 90 TABLET | Refills: 1 | Status: SHIPPED | OUTPATIENT
Start: 2018-03-06 | End: 2018-09-10 | Stop reason: SDUPTHER

## 2018-03-06 RX ORDER — SIMVASTATIN 20 MG
20 TABLET ORAL DAILY
Qty: 90 TABLET | Refills: 1 | Status: SHIPPED | OUTPATIENT
Start: 2018-03-06 | End: 2018-09-10 | Stop reason: SDUPTHER

## 2018-03-06 RX ORDER — OMEPRAZOLE 20 MG/1
20 CAPSULE, DELAYED RELEASE ORAL DAILY
Qty: 90 CAPSULE | Refills: 0 | Status: SHIPPED | OUTPATIENT
Start: 2018-03-06 | End: 2018-06-11 | Stop reason: SDUPTHER

## 2018-03-06 NOTE — TELEPHONE ENCOUNTER
Pt is requesting 90-day rx refills on the following:    Omeprazole 20mg  Fenofibrate 145mg  Finasteride 5mg  Simvastatin 20mg    [facility][phone].

## 2018-06-11 DIAGNOSIS — K21.9 GASTROESOPHAGEAL REFLUX DISEASE, ESOPHAGITIS PRESENCE NOT SPECIFIED: ICD-10-CM

## 2018-06-11 RX ORDER — OMEPRAZOLE 20 MG/1
CAPSULE, DELAYED RELEASE ORAL
Qty: 90 CAPSULE | Refills: 0 | Status: SHIPPED | OUTPATIENT
Start: 2018-06-11 | End: 2018-09-10 | Stop reason: SDUPTHER

## 2018-07-26 ENCOUNTER — OFFICE VISIT (OUTPATIENT)
Dept: INTERNAL MEDICINE | Age: 68
End: 2018-07-26

## 2018-07-26 VITALS
WEIGHT: 170 LBS | BODY MASS INDEX: 26.68 KG/M2 | OXYGEN SATURATION: 97 % | RESPIRATION RATE: 13 BRPM | HEIGHT: 67 IN | HEART RATE: 55 BPM | DIASTOLIC BLOOD PRESSURE: 80 MMHG | TEMPERATURE: 96.5 F | SYSTOLIC BLOOD PRESSURE: 138 MMHG

## 2018-07-26 DIAGNOSIS — I10 BENIGN ESSENTIAL HYPERTENSION: Primary | ICD-10-CM

## 2018-07-26 DIAGNOSIS — E78.2 MIXED HYPERLIPIDEMIA: ICD-10-CM

## 2018-07-26 LAB
ALBUMIN SERPL-MCNC: 4.6 G/DL (ref 3.5–5.2)
ALBUMIN/GLOB SERPL: 2.1 G/DL
ALP SERPL-CCNC: 41 U/L (ref 39–117)
ALT SERPL-CCNC: 19 U/L (ref 1–41)
AST SERPL-CCNC: 22 U/L (ref 1–40)
BILIRUB SERPL-MCNC: 0.4 MG/DL (ref 0.1–1.2)
BUN SERPL-MCNC: 20 MG/DL (ref 8–23)
BUN/CREAT SERPL: 16.4 (ref 7–25)
CALCIUM SERPL-MCNC: 9.9 MG/DL (ref 8.6–10.5)
CHLORIDE SERPL-SCNC: 107 MMOL/L (ref 98–107)
CHOLEST SERPL-MCNC: 154 MG/DL (ref 0–200)
CO2 SERPL-SCNC: 28.4 MMOL/L (ref 22–29)
CREAT SERPL-MCNC: 1.22 MG/DL (ref 0.76–1.27)
GLOBULIN SER CALC-MCNC: 2.2 GM/DL
GLUCOSE SERPL-MCNC: 94 MG/DL (ref 65–99)
HDLC SERPL-MCNC: 53 MG/DL (ref 40–60)
LDLC SERPL CALC-MCNC: 84 MG/DL (ref 0–100)
POTASSIUM SERPL-SCNC: 4.7 MMOL/L (ref 3.5–5.2)
PROT SERPL-MCNC: 6.8 G/DL (ref 6–8.5)
SODIUM SERPL-SCNC: 146 MMOL/L (ref 136–145)
TRIGL SERPL-MCNC: 87 MG/DL (ref 0–150)
VLDLC SERPL CALC-MCNC: 17.4 MG/DL (ref 5–40)

## 2018-07-26 PROCEDURE — 99214 OFFICE O/P EST MOD 30 MIN: CPT | Performed by: INTERNAL MEDICINE

## 2018-07-26 NOTE — PROGRESS NOTES
Juan F Sanz is a 68 y.o. male who presents with   Chief Complaint   Patient presents with   • Hypertension   • Hyperlipidemia   .    68-year-old male presents for his six-month checkup/lab update visit.  No complaints or problems on today's visit.      Hypertension   This is a chronic problem. The current episode started more than 1 year ago. The problem is unchanged. The problem is controlled. Current antihypertension treatment includes diuretics. The current treatment provides moderate improvement. There are no compliance problems.    Hyperlipidemia   The current episode started more than 1 year ago. The problem is controlled. Recent lipid tests were reviewed and are normal. Current antihyperlipidemic treatment includes statins and fibric acid derivatives. The current treatment provides moderate improvement of lipids. There are no compliance problems.         The following portions of the patient's history were reviewed and updated as appropriate: allergies, current medications, past medical history and problem list.    Review of Systems   Constitutional: Negative.    HENT: Negative.    Eyes: Negative.    Respiratory: Negative.    Cardiovascular: Negative.    Genitourinary: Negative.    Musculoskeletal: Negative.    Skin: Negative.    Neurological: Negative.    Psychiatric/Behavioral: Negative.        Objective   Physical Exam   Constitutional: He is oriented to person, place, and time. He appears well-developed and well-nourished. No distress.   HENT:   Head: Normocephalic and atraumatic.   Eyes: Pupils are equal, round, and reactive to light. Conjunctivae and EOM are normal.   Neck: Normal range of motion. Neck supple. No thyromegaly present.   Neck exam negative.  Carotid auscultation normal-no bruits heard.   Cardiovascular: Normal rate, regular rhythm, normal heart sounds and intact distal pulses.  Exam reveals no gallop and no friction rub.    No murmur heard.  Pulmonary/Chest: Effort normal and breath  sounds normal. No respiratory distress. He has no wheezes. He has no rales. He exhibits no tenderness.   Neurological: He is alert and oriented to person, place, and time.   Psychiatric: He has a normal mood and affect. His behavior is normal. Judgment and thought content normal.   Nursing note and vitals reviewed.      Assessment/Plan   Juan F was seen today for hypertension and hyperlipidemia.    Diagnoses and all orders for this visit:    Benign essential hypertension  -     Comprehensive Metabolic Panel    Mixed hyperlipidemia  -     Comprehensive Metabolic Panel  -     Lipid Panel      Plan: Labs as above.Today's exam unremarkable for any new problems or events.  Continue all current treatment as prescribed.  A follow-up checkup/lab update visit is advised for 6 months assuming today's labs are all acceptable.    Hypertension: The patient's blood pressure is at the upper ranges of acceptability although at home he says it is consistently around 120- 130/70.  For now he will continue current treatment as prescribed but we will monitor his blood pressure at home a little more closely and if at or above 130/80 consistently then he will return in the next 4-6 weeks for reevaluation and reassessment of medication needs, otherwise we will see him in 6 months as planned and as noted above.

## 2018-07-27 NOTE — PROGRESS NOTES
All of the enclosed lab results are acceptable. None of  the the minimally abnormal values  are of any great clinical significance. Please repeat the labs in 6 months.Dr Walker for Dr Martinez

## 2018-09-10 DIAGNOSIS — N40.0 BPH WITHOUT URINARY OBSTRUCTION: ICD-10-CM

## 2018-09-10 DIAGNOSIS — E78.2 MIXED HYPERLIPIDEMIA: ICD-10-CM

## 2018-09-10 DIAGNOSIS — K21.9 GASTROESOPHAGEAL REFLUX DISEASE, ESOPHAGITIS PRESENCE NOT SPECIFIED: ICD-10-CM

## 2018-09-11 RX ORDER — FENOFIBRATE 145 MG/1
TABLET, COATED ORAL
Qty: 90 TABLET | Refills: 1 | Status: SHIPPED | OUTPATIENT
Start: 2018-09-11 | End: 2019-03-08 | Stop reason: SDUPTHER

## 2018-09-11 RX ORDER — FINASTERIDE 5 MG/1
TABLET, FILM COATED ORAL
Qty: 90 TABLET | Refills: 1 | Status: SHIPPED | OUTPATIENT
Start: 2018-09-11 | End: 2019-03-08 | Stop reason: SDUPTHER

## 2018-09-11 RX ORDER — SIMVASTATIN 20 MG
TABLET ORAL
Qty: 90 TABLET | Refills: 1 | Status: SHIPPED | OUTPATIENT
Start: 2018-09-11 | End: 2019-03-08 | Stop reason: SDUPTHER

## 2018-09-11 RX ORDER — OMEPRAZOLE 20 MG/1
CAPSULE, DELAYED RELEASE ORAL
Qty: 90 CAPSULE | Refills: 1 | Status: SHIPPED | OUTPATIENT
Start: 2018-09-11 | End: 2019-03-08 | Stop reason: SDUPTHER

## 2018-12-11 ENCOUNTER — TELEPHONE (OUTPATIENT)
Dept: INTERNAL MEDICINE | Age: 68
End: 2018-12-11

## 2018-12-11 DIAGNOSIS — I10 BENIGN ESSENTIAL HYPERTENSION: ICD-10-CM

## 2018-12-11 RX ORDER — HYDROCHLOROTHIAZIDE 25 MG/1
25 TABLET ORAL DAILY
Qty: 90 TABLET | Refills: 0 | Status: SHIPPED | OUTPATIENT
Start: 2018-12-11 | End: 2019-04-17 | Stop reason: SDUPTHER

## 2019-01-28 ENCOUNTER — OFFICE VISIT (OUTPATIENT)
Dept: INTERNAL MEDICINE | Age: 69
End: 2019-01-28

## 2019-01-28 VITALS
TEMPERATURE: 98 F | OXYGEN SATURATION: 98 % | HEART RATE: 58 BPM | HEIGHT: 67 IN | RESPIRATION RATE: 13 BRPM | SYSTOLIC BLOOD PRESSURE: 146 MMHG | WEIGHT: 172 LBS | DIASTOLIC BLOOD PRESSURE: 68 MMHG | BODY MASS INDEX: 27 KG/M2

## 2019-01-28 DIAGNOSIS — I10 BENIGN ESSENTIAL HYPERTENSION: Primary | ICD-10-CM

## 2019-01-28 DIAGNOSIS — E78.2 MIXED HYPERLIPIDEMIA: ICD-10-CM

## 2019-01-28 PROBLEM — L57.0 ACTINIC KERATOSES: Status: ACTIVE | Noted: 2019-01-28

## 2019-01-28 LAB
ALBUMIN SERPL-MCNC: 4.3 G/DL (ref 3.5–5.2)
ALBUMIN/GLOB SERPL: 1.7 G/DL
ALP SERPL-CCNC: 35 U/L (ref 39–117)
ALT SERPL-CCNC: 28 U/L (ref 1–41)
AST SERPL-CCNC: 33 U/L (ref 1–40)
BILIRUB SERPL-MCNC: 0.3 MG/DL (ref 0.1–1.2)
BUN SERPL-MCNC: 19 MG/DL (ref 8–23)
BUN/CREAT SERPL: 16 (ref 7–25)
CALCIUM SERPL-MCNC: 9.6 MG/DL (ref 8.6–10.5)
CHLORIDE SERPL-SCNC: 103 MMOL/L (ref 98–107)
CHOLEST SERPL-MCNC: 169 MG/DL (ref 0–200)
CO2 SERPL-SCNC: 27.6 MMOL/L (ref 22–29)
CREAT SERPL-MCNC: 1.19 MG/DL (ref 0.76–1.27)
GLOBULIN SER CALC-MCNC: 2.5 GM/DL
GLUCOSE SERPL-MCNC: 89 MG/DL (ref 65–99)
HDLC SERPL-MCNC: 44 MG/DL (ref 40–60)
LDLC SERPL CALC-MCNC: 104 MG/DL (ref 0–100)
POTASSIUM SERPL-SCNC: 4.1 MMOL/L (ref 3.5–5.2)
PROT SERPL-MCNC: 6.8 G/DL (ref 6–8.5)
SODIUM SERPL-SCNC: 144 MMOL/L (ref 136–145)
TRIGL SERPL-MCNC: 107 MG/DL (ref 0–150)
VLDLC SERPL CALC-MCNC: 21.4 MG/DL (ref 5–40)

## 2019-01-28 PROCEDURE — 99214 OFFICE O/P EST MOD 30 MIN: CPT | Performed by: INTERNAL MEDICINE

## 2019-01-28 RX ORDER — IMIQUIMOD 37.5 MG/G
CREAM TOPICAL
Refills: 0 | COMMUNITY
Start: 2018-12-28 | End: 2019-01-28

## 2019-01-28 NOTE — PROGRESS NOTES
"  Juan F Sanz is a 68 y.o. male who presents with   Chief Complaint   Patient presents with   • Hypertension     Patient reports blood pressures at home 100-120/70-80 but he admittedly has only checked his blood pressure \"about 3 times over the last 3 weeks\"   • Hyperlipidemia   .    68-year-old male.  Six-month checkup/lab update visit.  No complaints or problems.  He continues to see Dr. Kay for urology care and gets PSAs he says \"a couple of times a year\".      Hypertension   This is a chronic problem. The current episode started more than 1 year ago. The problem has been waxing and waning since onset. The problem is controlled. Current antihypertension treatment includes diuretics. The current treatment provides mild improvement. There are no compliance problems.    Hyperlipidemia   This is a chronic problem. The current episode started more than 1 year ago. The problem is controlled. Recent lipid tests were reviewed and are normal. Current antihyperlipidemic treatment includes statins and fibric acid derivatives. The current treatment provides moderate improvement of lipids. There are no compliance problems.         The following portions of the patient's history were reviewed and updated as appropriate: allergies, current medications, past medical history and problem list.    Review of Systems   Constitutional: Negative.    HENT: Negative.    Eyes: Negative.    Respiratory: Negative.    Cardiovascular: Negative.    Genitourinary: Negative.    Musculoskeletal: Negative.    Skin: Negative.    Neurological: Negative.    Psychiatric/Behavioral: Negative.        Objective   Physical Exam   Constitutional: He is oriented to person, place, and time. He appears well-developed and well-nourished. No distress.   HENT:   Head: Normocephalic and atraumatic.   Eyes: Conjunctivae and EOM are normal. Pupils are equal, round, and reactive to light.   Neck: Normal range of motion. Neck supple. No thyromegaly present. "   Neck exam negative.  Carotid auscultation normal-no bruits heard.   Cardiovascular: Normal rate, regular rhythm, normal heart sounds and intact distal pulses. Exam reveals no gallop and no friction rub.   No murmur heard.  Pulmonary/Chest: Effort normal and breath sounds normal. No respiratory distress. He has no wheezes. He has no rales. He exhibits no tenderness.   Neurological: He is alert and oriented to person, place, and time.   Psychiatric: He has a normal mood and affect. His behavior is normal. Judgment and thought content normal.   Nursing note and vitals reviewed.      Assessment/Plan   Juan F was seen today for hypertension and hyperlipidemia.    Diagnoses and all orders for this visit:    Benign essential hypertension  -     Comprehensive Metabolic Panel    Mixed hyperlipidemia  -     Comprehensive Metabolic Panel  -     Lipid Panel        Plan: Labs as above.Today's exam unremarkable for any new problems or events.  Continue all current treatment as prescribed.  A follow-up checkup/lab update visit is advised for 6 months assuming today's labs are all acceptable.    The patient's blood pressure is mildly elevated today at 146/68.  He will monitor his blood pressure at home twice a day over the next 3-4 weeks.  If his blood pressure is consistently at 130/80 or below we will plan on seeing him in 6 months for a checkup/lab update visit.  If it is consistently outside of that range or bouncing in and out of that range then we will see him in the next 3-4 weeks for a blood pressure reassessment visit.

## 2019-03-08 DIAGNOSIS — N40.0 BPH WITHOUT URINARY OBSTRUCTION: ICD-10-CM

## 2019-03-08 DIAGNOSIS — E78.2 MIXED HYPERLIPIDEMIA: ICD-10-CM

## 2019-03-08 DIAGNOSIS — K21.9 GASTROESOPHAGEAL REFLUX DISEASE, ESOPHAGITIS PRESENCE NOT SPECIFIED: ICD-10-CM

## 2019-03-08 RX ORDER — FINASTERIDE 5 MG/1
TABLET, FILM COATED ORAL
Qty: 90 TABLET | Refills: 0 | Status: SHIPPED | OUTPATIENT
Start: 2019-03-08 | End: 2021-03-03

## 2019-03-08 RX ORDER — OMEPRAZOLE 20 MG/1
CAPSULE, DELAYED RELEASE ORAL
Qty: 90 CAPSULE | Refills: 0 | Status: SHIPPED | OUTPATIENT
Start: 2019-03-08 | End: 2019-06-12 | Stop reason: SDUPTHER

## 2019-03-08 RX ORDER — FENOFIBRATE 145 MG/1
TABLET, COATED ORAL
Qty: 90 TABLET | Refills: 0 | Status: SHIPPED | OUTPATIENT
Start: 2019-03-08 | End: 2019-06-12 | Stop reason: SDUPTHER

## 2019-03-08 RX ORDER — SIMVASTATIN 20 MG
TABLET ORAL
Qty: 90 TABLET | Refills: 0 | Status: SHIPPED | OUTPATIENT
Start: 2019-03-08 | End: 2019-06-12 | Stop reason: SDUPTHER

## 2019-04-17 DIAGNOSIS — I10 BENIGN ESSENTIAL HYPERTENSION: ICD-10-CM

## 2019-04-18 RX ORDER — HYDROCHLOROTHIAZIDE 25 MG/1
TABLET ORAL
Qty: 90 TABLET | Refills: 0 | Status: SHIPPED | OUTPATIENT
Start: 2019-04-18 | End: 2019-10-22 | Stop reason: SDUPTHER

## 2019-06-12 DIAGNOSIS — E78.2 MIXED HYPERLIPIDEMIA: ICD-10-CM

## 2019-06-12 DIAGNOSIS — K21.9 GASTROESOPHAGEAL REFLUX DISEASE, ESOPHAGITIS PRESENCE NOT SPECIFIED: ICD-10-CM

## 2019-06-12 RX ORDER — OMEPRAZOLE 20 MG/1
CAPSULE, DELAYED RELEASE ORAL
Qty: 90 CAPSULE | Refills: 0 | Status: SHIPPED | OUTPATIENT
Start: 2019-06-12 | End: 2019-09-04 | Stop reason: SDUPTHER

## 2019-06-12 RX ORDER — FENOFIBRATE 145 MG/1
TABLET, COATED ORAL
Qty: 90 TABLET | Refills: 0 | Status: SHIPPED | OUTPATIENT
Start: 2019-06-12 | End: 2019-09-04 | Stop reason: SDUPTHER

## 2019-06-12 RX ORDER — SIMVASTATIN 20 MG
TABLET ORAL
Qty: 90 TABLET | Refills: 0 | Status: SHIPPED | OUTPATIENT
Start: 2019-06-12 | End: 2019-09-04 | Stop reason: SDUPTHER

## 2019-07-29 ENCOUNTER — OFFICE VISIT (OUTPATIENT)
Dept: INTERNAL MEDICINE | Age: 69
End: 2019-07-29

## 2019-07-29 VITALS
SYSTOLIC BLOOD PRESSURE: 124 MMHG | RESPIRATION RATE: 13 BRPM | HEART RATE: 60 BPM | TEMPERATURE: 97.5 F | OXYGEN SATURATION: 98 % | BODY MASS INDEX: 26.09 KG/M2 | WEIGHT: 166.2 LBS | HEIGHT: 67 IN | DIASTOLIC BLOOD PRESSURE: 64 MMHG

## 2019-07-29 DIAGNOSIS — Z00.00 HEALTHCARE MAINTENANCE: ICD-10-CM

## 2019-07-29 DIAGNOSIS — I10 BENIGN ESSENTIAL HYPERTENSION: Primary | ICD-10-CM

## 2019-07-29 DIAGNOSIS — Z23 NEED FOR TDAP VACCINATION: ICD-10-CM

## 2019-07-29 DIAGNOSIS — E78.2 MIXED HYPERLIPIDEMIA: ICD-10-CM

## 2019-07-29 PROCEDURE — 90715 TDAP VACCINE 7 YRS/> IM: CPT | Performed by: INTERNAL MEDICINE

## 2019-07-29 PROCEDURE — 99213 OFFICE O/P EST LOW 20 MIN: CPT | Performed by: INTERNAL MEDICINE

## 2019-07-29 PROCEDURE — 90471 IMMUNIZATION ADMIN: CPT | Performed by: INTERNAL MEDICINE

## 2019-07-29 NOTE — PROGRESS NOTES
Juan F Sanz is a 69 y.o. male who presents with   Chief Complaint   Patient presents with   • Hypertension     Hydrochlorothiazide 25 mg; blood pressures at home 100-136/60-80 consistently   • Hyperlipidemia     Simvastatin 20 mg; fenofibrate 145 mg   • Hepatitis C testing     Per insurance recommendation.  No known exposure of hep C from the past   .    69-year-old male.  6-month checkup/lab update visit no complaints or problems.  Blood pressure range at home as above.      Hypertension   This is a chronic problem. The current episode started more than 1 year ago. The problem is controlled. Current antihypertension treatment includes diuretics. The current treatment provides moderate improvement. There are no compliance problems.    Hyperlipidemia   This is a chronic problem. The current episode started more than 1 year ago. The problem is controlled. Recent lipid tests were reviewed and are normal. Current antihyperlipidemic treatment includes statins and fibric acid derivatives. The current treatment provides moderate improvement of lipids. There are no compliance problems.         The following portions of the patient's history were reviewed and updated as appropriate: allergies, current medications, past medical history and problem list.    Review of Systems   Constitutional: Negative.    HENT: Negative.    Eyes: Negative.    Respiratory: Negative.    Cardiovascular: Negative.    Genitourinary: Negative.    Musculoskeletal: Negative.    Skin: Negative.    Neurological: Negative.    Psychiatric/Behavioral: Negative.        Objective   Physical Exam   Constitutional: He is oriented to person, place, and time. He appears well-developed and well-nourished. No distress.   HENT:   Head: Normocephalic and atraumatic.   Eyes: Conjunctivae and EOM are normal. Pupils are equal, round, and reactive to light.   Neck: Normal range of motion. Neck supple. No thyromegaly present.   Neck exam negative.  Carotid  auscultation normal-no bruits heard.   Cardiovascular: Normal rate, regular rhythm, normal heart sounds and intact distal pulses. Exam reveals no gallop and no friction rub.   No murmur heard.  Pulmonary/Chest: Effort normal and breath sounds normal. No respiratory distress. He has no wheezes. He has no rales. He exhibits no tenderness.   Neurological: He is alert and oriented to person, place, and time.   Psychiatric: He has a normal mood and affect. His behavior is normal. Judgment and thought content normal.   Nursing note and vitals reviewed.      Assessment/Plan   Juan F was seen today for hypertension, hyperlipidemia and hepatitis c testing.    Diagnoses and all orders for this visit:    Benign essential hypertension  -     Comprehensive Metabolic Panel    Mixed hyperlipidemia  -     Comprehensive Metabolic Panel  -     Lipid Panel    Healthcare maintenance  -     Hepatitis C Antibody        Plan: Labs as above.Today's exam unremarkable for any new problems or events.  Continue all current treatment as prescribed.  A follow-up checkup/lab update visit is advised for 6 months assuming today's labs are all acceptable.    Tdap/Td updated today.    Wellness visit advised but declined.

## 2019-07-30 LAB
ALBUMIN SERPL-MCNC: 4.4 G/DL (ref 3.5–5.2)
ALBUMIN/GLOB SERPL: 1.6 G/DL
ALP SERPL-CCNC: 38 U/L (ref 39–117)
ALT SERPL-CCNC: 17 U/L (ref 1–41)
AST SERPL-CCNC: 29 U/L (ref 1–40)
BILIRUB SERPL-MCNC: 0.5 MG/DL (ref 0.2–1.2)
BUN SERPL-MCNC: 19 MG/DL (ref 8–23)
BUN/CREAT SERPL: 14.3 (ref 7–25)
CALCIUM SERPL-MCNC: 10.2 MG/DL (ref 8.6–10.5)
CHLORIDE SERPL-SCNC: 102 MMOL/L (ref 98–107)
CHOLEST SERPL-MCNC: 165 MG/DL (ref 0–200)
CO2 SERPL-SCNC: 29.6 MMOL/L (ref 22–29)
CREAT SERPL-MCNC: 1.33 MG/DL (ref 0.76–1.27)
GLOBULIN SER CALC-MCNC: 2.8 GM/DL
GLUCOSE SERPL-MCNC: 95 MG/DL (ref 65–99)
HCV AB S/CO SERPL IA: <0.1 S/CO RATIO (ref 0–0.9)
HDLC SERPL-MCNC: 51 MG/DL (ref 40–60)
LDLC SERPL CALC-MCNC: 85 MG/DL (ref 0–100)
POTASSIUM SERPL-SCNC: 4.4 MMOL/L (ref 3.5–5.2)
PROT SERPL-MCNC: 7.2 G/DL (ref 6–8.5)
SODIUM SERPL-SCNC: 144 MMOL/L (ref 136–145)
TRIGL SERPL-MCNC: 144 MG/DL (ref 0–150)
VLDLC SERPL CALC-MCNC: 28.8 MG/DL

## 2019-09-04 DIAGNOSIS — E78.2 MIXED HYPERLIPIDEMIA: ICD-10-CM

## 2019-09-04 DIAGNOSIS — K21.9 GASTROESOPHAGEAL REFLUX DISEASE, ESOPHAGITIS PRESENCE NOT SPECIFIED: ICD-10-CM

## 2019-09-04 RX ORDER — SIMVASTATIN 20 MG
TABLET ORAL
Qty: 90 TABLET | Refills: 0 | Status: SHIPPED | OUTPATIENT
Start: 2019-09-04 | End: 2019-12-01 | Stop reason: SDUPTHER

## 2019-09-04 RX ORDER — FENOFIBRATE 145 MG/1
TABLET, COATED ORAL
Qty: 90 TABLET | Refills: 0 | Status: SHIPPED | OUTPATIENT
Start: 2019-09-04 | End: 2019-12-01 | Stop reason: SDUPTHER

## 2019-09-04 RX ORDER — OMEPRAZOLE 20 MG/1
CAPSULE, DELAYED RELEASE ORAL
Qty: 90 CAPSULE | Refills: 0 | Status: SHIPPED | OUTPATIENT
Start: 2019-09-04 | End: 2019-12-01 | Stop reason: SDUPTHER

## 2019-10-22 DIAGNOSIS — I10 BENIGN ESSENTIAL HYPERTENSION: ICD-10-CM

## 2019-10-22 RX ORDER — HYDROCHLOROTHIAZIDE 25 MG/1
TABLET ORAL
Qty: 90 TABLET | Refills: 0 | Status: SHIPPED | OUTPATIENT
Start: 2019-10-22 | End: 2019-12-01 | Stop reason: SDUPTHER

## 2019-12-01 DIAGNOSIS — K21.9 GASTROESOPHAGEAL REFLUX DISEASE, ESOPHAGITIS PRESENCE NOT SPECIFIED: ICD-10-CM

## 2019-12-01 DIAGNOSIS — I10 BENIGN ESSENTIAL HYPERTENSION: ICD-10-CM

## 2019-12-01 DIAGNOSIS — E78.2 MIXED HYPERLIPIDEMIA: ICD-10-CM

## 2019-12-02 RX ORDER — OMEPRAZOLE 20 MG/1
CAPSULE, DELAYED RELEASE ORAL
Qty: 90 CAPSULE | Refills: 3 | Status: SHIPPED | OUTPATIENT
Start: 2019-12-02 | End: 2020-11-30

## 2019-12-02 RX ORDER — FENOFIBRATE 145 MG/1
TABLET, COATED ORAL
Qty: 90 TABLET | Refills: 3 | Status: SHIPPED | OUTPATIENT
Start: 2019-12-02 | End: 2020-11-30

## 2019-12-02 RX ORDER — SIMVASTATIN 20 MG
TABLET ORAL
Qty: 90 TABLET | Refills: 3 | Status: SHIPPED | OUTPATIENT
Start: 2019-12-02 | End: 2020-11-30

## 2019-12-02 RX ORDER — HYDROCHLOROTHIAZIDE 25 MG/1
TABLET ORAL
Qty: 90 TABLET | Refills: 3 | Status: SHIPPED | OUTPATIENT
Start: 2019-12-02 | End: 2021-02-25 | Stop reason: SDUPTHER

## 2020-01-29 ENCOUNTER — OFFICE VISIT (OUTPATIENT)
Dept: INTERNAL MEDICINE | Age: 70
End: 2020-01-29

## 2020-01-29 VITALS
TEMPERATURE: 98.2 F | BODY MASS INDEX: 26.43 KG/M2 | RESPIRATION RATE: 13 BRPM | DIASTOLIC BLOOD PRESSURE: 82 MMHG | WEIGHT: 168.4 LBS | HEART RATE: 58 BPM | SYSTOLIC BLOOD PRESSURE: 144 MMHG | HEIGHT: 67 IN | OXYGEN SATURATION: 97 %

## 2020-01-29 DIAGNOSIS — I10 BENIGN ESSENTIAL HYPERTENSION: Primary | ICD-10-CM

## 2020-01-29 DIAGNOSIS — E78.2 MIXED HYPERLIPIDEMIA: ICD-10-CM

## 2020-01-29 LAB
ALBUMIN SERPL-MCNC: 4.6 G/DL (ref 3.5–5.2)
ALBUMIN/GLOB SERPL: 1.9 G/DL
ALP SERPL-CCNC: 37 U/L (ref 39–117)
ALT SERPL-CCNC: 16 U/L (ref 1–41)
AST SERPL-CCNC: 27 U/L (ref 1–40)
BILIRUB SERPL-MCNC: 0.4 MG/DL (ref 0.2–1.2)
BUN SERPL-MCNC: 17 MG/DL (ref 8–23)
BUN/CREAT SERPL: 13.8 (ref 7–25)
CALCIUM SERPL-MCNC: 9.9 MG/DL (ref 8.6–10.5)
CHLORIDE SERPL-SCNC: 102 MMOL/L (ref 98–107)
CHOLEST SERPL-MCNC: 166 MG/DL (ref 0–200)
CO2 SERPL-SCNC: 28.2 MMOL/L (ref 22–29)
CREAT SERPL-MCNC: 1.23 MG/DL (ref 0.76–1.27)
GLOBULIN SER CALC-MCNC: 2.4 GM/DL
GLUCOSE SERPL-MCNC: 90 MG/DL (ref 65–99)
HDLC SERPL-MCNC: 53 MG/DL (ref 40–60)
LDLC SERPL CALC-MCNC: 95 MG/DL (ref 0–100)
POTASSIUM SERPL-SCNC: 4.6 MMOL/L (ref 3.5–5.2)
PROT SERPL-MCNC: 7 G/DL (ref 6–8.5)
SODIUM SERPL-SCNC: 142 MMOL/L (ref 136–145)
TRIGL SERPL-MCNC: 92 MG/DL (ref 0–150)
VLDLC SERPL CALC-MCNC: 18.4 MG/DL

## 2020-01-29 PROCEDURE — 99214 OFFICE O/P EST MOD 30 MIN: CPT | Performed by: INTERNAL MEDICINE

## 2020-01-29 NOTE — PROGRESS NOTES
"  Juan F Sanz is a 69 y.o. male who presents with   Chief Complaint   Patient presents with   • Hypertension     Hydrochlorothiazide 25 mg daily; not monitoring blood pressure with regularity but he said he tested it about a week ago when it was around 124/60   • Hyperlipidemia     Fenofibrate 145 mg; simvastatin 20 mg   .    69-year-old male.  6-month checkup/lab update visit he has a firmness in his left palm/ring finger area but otherwise no complaints    Hypertension   This is a chronic problem. The current episode started more than 1 year ago. The problem is controlled. Current antihypertension treatment includes diuretics. The current treatment provides moderate improvement. There are no compliance problems.    Hyperlipidemia   This is a chronic problem. The current episode started more than 1 year ago. The problem is controlled. Recent lipid tests were reviewed and are normal. Current antihyperlipidemic treatment includes fibric acid derivatives and statins. The current treatment provides moderate improvement of lipids. There are no compliance problems.         /82   Pulse 58   Temp 98.2 °F (36.8 °C)   Resp 13   Ht 170 cm (66.93\")   Wt 76.4 kg (168 lb 6.4 oz)   SpO2 97%   BMI 26.43 kg/m²     The following portions of the patient's history were reviewed and updated as appropriate: allergies, current medications, past medical history and problem list.    Review of Systems   Constitutional: Negative.    HENT: Negative.    Eyes: Negative.    Respiratory: Negative.    Cardiovascular: Negative.    Genitourinary: Negative.    Musculoskeletal: Negative.    Skin: Negative.    Neurological: Negative.    Psychiatric/Behavioral: Negative.        Objective   Physical Exam   Constitutional: He is oriented to person, place, and time. He appears well-developed and well-nourished. No distress.   HENT:   Head: Normocephalic and atraumatic.   Eyes: Pupils are equal, round, and reactive to light. Conjunctivae " and EOM are normal.   Neck: Normal range of motion. Neck supple. No thyromegaly present.   Neck exam negative.  Carotid auscultation normal-no bruits heard.   Cardiovascular: Normal rate, regular rhythm, normal heart sounds and intact distal pulses. Exam reveals no gallop and no friction rub.   No murmur heard.  Pulmonary/Chest: Effort normal and breath sounds normal. No respiratory distress. He has no wheezes. He has no rales. He exhibits no tenderness.   Musculoskeletal:   Left palmar surface-ring finger shows a firmness suggesting a possible tendon cyst or early Dupuytren's contracture?   Neurological: He is alert and oriented to person, place, and time.   Psychiatric: He has a normal mood and affect. His behavior is normal. Judgment and thought content normal.   Nursing note and vitals reviewed.      Assessment/Plan   Juan F was seen today for hypertension and hyperlipidemia.    Diagnoses and all orders for this visit:    Benign essential hypertension  -     Comprehensive Metabolic Panel    Mixed hyperlipidemia  -     Comprehensive Metabolic Panel  -     Lipid Panel      Plan:Blood pressure on today's visit elevated at 144/82.  The patient was advised to monitor blood pressure twice daily at home trying to keep the blood pressure consistently at or below 130/80.  I have suggested a 3-week period of observation to assess consistency.  If blood pressure is consistently above this level I have suggested a follow-up visit in 3 weeks to reassess the blood pressure level.    Labs as above for the issues as outlined.  The patient's labs over the last several years were reviewed and with minor variations there do not appear to be any significant changes over the last 3 years or so.  Given this I have suggested that we start seeing him annually at which time we will do a Medicare wellness visit with lab updates    We will also plan hand surgery referral if the cystic-like area in his left palm as described above does  not resolve.       Answers for HPI/ROS submitted by the patient on 1/23/2020   How long have you been having these symptoms?: 1-4 weeks ago

## 2020-01-30 NOTE — PROGRESS NOTES
All recently tested labs are within normal / acceptable ranges. Continue all current meds as they are. A followup Medicare wellness visit with comprehensive lab updates is advised for 1 year as discussed

## 2020-11-30 DIAGNOSIS — K21.9 GASTROESOPHAGEAL REFLUX DISEASE: ICD-10-CM

## 2020-11-30 DIAGNOSIS — E78.2 MIXED HYPERLIPIDEMIA: ICD-10-CM

## 2020-11-30 RX ORDER — SIMVASTATIN 20 MG
TABLET ORAL
Qty: 90 TABLET | Refills: 0 | Status: SHIPPED | OUTPATIENT
Start: 2020-11-30 | End: 2021-02-25 | Stop reason: SDUPTHER

## 2020-11-30 RX ORDER — FENOFIBRATE 145 MG/1
TABLET, COATED ORAL
Qty: 90 TABLET | Refills: 0 | Status: SHIPPED | OUTPATIENT
Start: 2020-11-30 | End: 2021-02-25 | Stop reason: SDUPTHER

## 2020-11-30 RX ORDER — OMEPRAZOLE 20 MG/1
CAPSULE, DELAYED RELEASE ORAL
Qty: 90 CAPSULE | Refills: 0 | Status: SHIPPED | OUTPATIENT
Start: 2020-11-30 | End: 2021-02-25 | Stop reason: SDUPTHER

## 2021-02-25 DIAGNOSIS — E78.2 MIXED HYPERLIPIDEMIA: ICD-10-CM

## 2021-02-25 DIAGNOSIS — K21.9 GASTROESOPHAGEAL REFLUX DISEASE: ICD-10-CM

## 2021-02-25 DIAGNOSIS — I10 BENIGN ESSENTIAL HYPERTENSION: ICD-10-CM

## 2021-02-25 RX ORDER — SIMVASTATIN 20 MG
20 TABLET ORAL DAILY
Qty: 90 TABLET | Refills: 0 | Status: SHIPPED | OUTPATIENT
Start: 2021-02-25 | End: 2021-06-02

## 2021-02-25 RX ORDER — OMEPRAZOLE 20 MG/1
20 CAPSULE, DELAYED RELEASE ORAL DAILY
Qty: 90 CAPSULE | Refills: 0 | Status: SHIPPED | OUTPATIENT
Start: 2021-02-25 | End: 2021-06-02

## 2021-02-25 RX ORDER — FENOFIBRATE 145 MG/1
145 TABLET, COATED ORAL DAILY
Qty: 90 TABLET | Refills: 0 | Status: SHIPPED | OUTPATIENT
Start: 2021-02-25 | End: 2021-06-02

## 2021-02-25 RX ORDER — HYDROCHLOROTHIAZIDE 25 MG/1
25 TABLET ORAL DAILY
Qty: 90 TABLET | Refills: 0 | Status: SHIPPED | OUTPATIENT
Start: 2021-02-25 | End: 2021-09-21

## 2021-03-03 ENCOUNTER — OFFICE VISIT (OUTPATIENT)
Dept: INTERNAL MEDICINE | Age: 71
End: 2021-03-03

## 2021-03-03 VITALS
DIASTOLIC BLOOD PRESSURE: 88 MMHG | HEART RATE: 71 BPM | TEMPERATURE: 97.1 F | WEIGHT: 169.6 LBS | HEIGHT: 67 IN | OXYGEN SATURATION: 99 % | SYSTOLIC BLOOD PRESSURE: 138 MMHG | BODY MASS INDEX: 26.62 KG/M2

## 2021-03-03 DIAGNOSIS — I10 BENIGN ESSENTIAL HYPERTENSION: Primary | ICD-10-CM

## 2021-03-03 DIAGNOSIS — E78.2 MIXED HYPERLIPIDEMIA: ICD-10-CM

## 2021-03-03 DIAGNOSIS — K21.9 GASTROESOPHAGEAL REFLUX DISEASE, UNSPECIFIED WHETHER ESOPHAGITIS PRESENT: ICD-10-CM

## 2021-03-03 LAB
ALBUMIN SERPL-MCNC: 4.6 G/DL (ref 3.5–5.2)
ALBUMIN/GLOB SERPL: 1.8 G/DL
ALP SERPL-CCNC: 41 U/L (ref 39–117)
ALT SERPL-CCNC: 12 U/L (ref 1–41)
AST SERPL-CCNC: 25 U/L (ref 1–40)
BILIRUB SERPL-MCNC: 0.4 MG/DL (ref 0–1.2)
BUN SERPL-MCNC: 18 MG/DL (ref 8–23)
BUN/CREAT SERPL: 15.9 (ref 7–25)
CALCIUM SERPL-MCNC: 10.1 MG/DL (ref 8.6–10.5)
CHLORIDE SERPL-SCNC: 104 MMOL/L (ref 98–107)
CHOLEST SERPL-MCNC: 162 MG/DL (ref 0–200)
CHOLEST/HDLC SERPL: 3.52 {RATIO}
CO2 SERPL-SCNC: 30 MMOL/L (ref 22–29)
CREAT SERPL-MCNC: 1.13 MG/DL (ref 0.76–1.27)
GLOBULIN SER CALC-MCNC: 2.6 GM/DL
GLUCOSE SERPL-MCNC: 94 MG/DL (ref 65–99)
HDLC SERPL-MCNC: 46 MG/DL (ref 40–60)
LDLC SERPL CALC-MCNC: 89 MG/DL (ref 0–100)
POTASSIUM SERPL-SCNC: 4 MMOL/L (ref 3.5–5.2)
PROT SERPL-MCNC: 7.2 G/DL (ref 6–8.5)
SODIUM SERPL-SCNC: 142 MMOL/L (ref 136–145)
TRIGL SERPL-MCNC: 155 MG/DL (ref 0–150)
VLDLC SERPL CALC-MCNC: 27 MG/DL (ref 5–40)

## 2021-03-03 PROCEDURE — 99214 OFFICE O/P EST MOD 30 MIN: CPT | Performed by: NURSE PRACTITIONER

## 2021-03-03 RX ORDER — DUTASTERIDE 0.5 MG/1
0.5 CAPSULE, LIQUID FILLED ORAL DAILY
COMMUNITY
Start: 2021-02-25

## 2021-03-03 NOTE — PROGRESS NOTES
"    I N T E R N A L  M E D I C I N E  LILIANE MOON, APRN      ENCOUNTER DATE:  03/03/2021    Juan F Sanz / 70 y.o. / male      CHIEF COMPLAINT / REASON FOR OFFICE VISIT     Hypertension (med f/u)      ASSESSMENT & PLAN     1. Benign essential hypertension  - Continue current HCTZ 25mg at this time.   - Comprehensive Metabolic Panel    2. Mixed hyperlipidemia  - Continue simvastatin 20mg tablet daily along with fenofibrate 145mg  - Lipid Panel With / Chol / HDL Ratio    3. GERD   - Continue prilosec 20mg tablet     Orders Placed This Encounter   Procedures   • Comprehensive Metabolic Panel   • Lipid Panel With / Chol / HDL Ratio     No orders of the defined types were placed in this encounter.      SUMMARY/DISCUSSION  • CMP and lipid panel updates today.  Assess kidney function to determine if safe to continue hydrochlorothiazide 25 mg.  • Follow-up as neck schedule in September with new PCP Dr. Howard.      Next Appointment with me: Visit date not found    Return for Next scheduled follow up as scheduled with Dr. Howard .      VITAL SIGNS     Visit Vitals  /88   Pulse 71   Temp 97.1 °F (36.2 °C) (Temporal)   Ht 170.2 cm (67\")   Wt 76.9 kg (169 lb 9.6 oz)   SpO2 99%   BMI 26.56 kg/m²       @BP@  Wt Readings from Last 3 Encounters:   03/03/21 76.9 kg (169 lb 9.6 oz)   01/29/20 76.4 kg (168 lb 6.4 oz)   07/29/19 75.4 kg (166 lb 3.2 oz)     Body mass index is 26.56 kg/m².      MEDICATIONS AT THE TIME OF OFFICE VISIT     Current Outpatient Medications on File Prior to Visit   Medication Sig   • cholecalciferol (VITAMIN D3) 1000 units tablet Take 1,000 Units by mouth Daily.   • fenofibrate (TRICOR) 145 MG tablet Take 1 tablet by mouth Daily. 200001   • hydroCHLOROthiazide (HYDRODIURIL) 25 MG tablet Take 1 tablet by mouth Daily.   • omeprazole (priLOSEC) 20 MG capsule Take 1 capsule by mouth Daily.   • simvastatin (ZOCOR) 20 MG tablet Take 1 tablet by mouth Daily. 200001   • tamsulosin (FLOMAX) 0.4 MG capsule 24 hr " capsule TAKE 1 CAPSULE BY MOUTH ONE TIME A DAY   • [DISCONTINUED] finasteride (PROSCAR) 5 MG tablet TAKE 1 TABLET BY MOUTH ONE TIME A DAY    • dutasteride (AVODART) 0.5 MG capsule Take 0.5 mg by mouth Daily.   • Loratadine (CLARITIN) 10 MG capsule Take 1 capsule by mouth.     No current facility-administered medications on file prior to visit.          HISTORY OF PRESENT ILLNESS     Hypertension: Stable on hydrochlorothiazide 25 mg daily.  CMP revealed normal creatinine 1.23, BUN 17 and GFR 58.  Patient states blood pressure at home are in the 120s over 70s.    Hyperlipidemia: Well-controlled on simvastatin 20 mg and fenofibrate 145 mg.  No myalgias.    GERD: Well-controlled on Prilosec 20 mg tablet daily.    REVIEW OF SYSTEMS     Constitutional neg except per HPI   Resp neg  CV neg    PHYSICAL EXAMINATION     Physical Exam  Constitutional  No distress  Cardiovascular Rate  normal . Rhythm: regular . Heart sounds:  normal  Pulmonary/Chest  Effort normal. Breath sounds:  normal  Psychiatric  Alert. Judgment and thought content normal. Mood normal     REVIEWED DATA     Labs:   Lab Results   Component Value Date    BUN 17 01/29/2020    CREATININE 1.23 01/29/2020    EGFRIFNONA 58 (L) 01/29/2020    EGFRIFAFRI 71 01/29/2020    BCR 13.8 01/29/2020    K 4.6 01/29/2020    CO2 28.2 01/29/2020    CALCIUM 9.9 01/29/2020    PROTENTOTREF 7.0 01/29/2020    ALBUMIN 4.60 01/29/2020    LABIL2 1.9 01/29/2020    AST 27 01/29/2020    ALT 16 01/29/2020     Lab Results   Component Value Date    CHLPL 166 01/29/2020    TRIG 92 01/29/2020    HDL 53 01/29/2020    LDL 95 01/29/2020         Imaging:           Medical Tests:             Summary of old records / correspondence / consultant report:           Request outside records:           *Examiner was wearing medical surgical mask, face shield and exam gloves during the entire duration of the visit. Patient was masked the entire time.   Minimum social distance of 6 ft maintained entire visit  except if physical contact was necessary as documented.     **Francie Disclaimer:   Much of this encounter note is an electronic transcription/translation of spoken language to printed text. The electronic translation of spoken language may permit erroneous, or at times, nonsensical words or phrases to be inadvertently transcribed. Although I have reviewed the note for such errors, some may still exist.

## 2021-06-01 DIAGNOSIS — E78.2 MIXED HYPERLIPIDEMIA: ICD-10-CM

## 2021-06-01 DIAGNOSIS — K21.9 GASTROESOPHAGEAL REFLUX DISEASE: ICD-10-CM

## 2021-06-02 RX ORDER — SIMVASTATIN 20 MG
TABLET ORAL
Qty: 90 TABLET | Refills: 0 | Status: SHIPPED | OUTPATIENT
Start: 2021-06-02 | End: 2021-08-31

## 2021-06-02 RX ORDER — OMEPRAZOLE 20 MG/1
CAPSULE, DELAYED RELEASE ORAL
Qty: 90 CAPSULE | Refills: 0 | Status: SHIPPED | OUTPATIENT
Start: 2021-06-02 | End: 2021-08-31

## 2021-06-02 RX ORDER — FENOFIBRATE 145 MG/1
TABLET, COATED ORAL
Qty: 90 TABLET | Refills: 0 | Status: SHIPPED | OUTPATIENT
Start: 2021-06-02 | End: 2021-08-31

## 2021-08-31 DIAGNOSIS — K21.9 GASTROESOPHAGEAL REFLUX DISEASE: ICD-10-CM

## 2021-08-31 DIAGNOSIS — E78.2 MIXED HYPERLIPIDEMIA: ICD-10-CM

## 2021-08-31 RX ORDER — SIMVASTATIN 20 MG
TABLET ORAL
Qty: 90 TABLET | Refills: 3 | Status: SHIPPED | OUTPATIENT
Start: 2021-08-31 | End: 2022-08-23

## 2021-08-31 RX ORDER — OMEPRAZOLE 20 MG/1
CAPSULE, DELAYED RELEASE ORAL
Qty: 90 CAPSULE | Refills: 3 | Status: SHIPPED | OUTPATIENT
Start: 2021-08-31 | End: 2022-08-23

## 2021-08-31 RX ORDER — FENOFIBRATE 145 MG/1
TABLET, COATED ORAL
Qty: 90 TABLET | Refills: 3 | Status: SHIPPED | OUTPATIENT
Start: 2021-08-31 | End: 2022-08-23

## 2021-09-21 ENCOUNTER — OFFICE VISIT (OUTPATIENT)
Dept: INTERNAL MEDICINE | Age: 71
End: 2021-09-21

## 2021-09-21 VITALS
DIASTOLIC BLOOD PRESSURE: 76 MMHG | OXYGEN SATURATION: 99 % | HEIGHT: 67 IN | BODY MASS INDEX: 25.74 KG/M2 | HEART RATE: 57 BPM | SYSTOLIC BLOOD PRESSURE: 164 MMHG | TEMPERATURE: 98 F | WEIGHT: 164 LBS

## 2021-09-21 DIAGNOSIS — K21.9 GASTROESOPHAGEAL REFLUX DISEASE, UNSPECIFIED WHETHER ESOPHAGITIS PRESENT: Chronic | ICD-10-CM

## 2021-09-21 DIAGNOSIS — I10 BENIGN ESSENTIAL HYPERTENSION: Primary | Chronic | ICD-10-CM

## 2021-09-21 DIAGNOSIS — N40.0 BPH WITHOUT URINARY OBSTRUCTION: Chronic | ICD-10-CM

## 2021-09-21 DIAGNOSIS — E78.2 MIXED HYPERLIPIDEMIA: Chronic | ICD-10-CM

## 2021-09-21 PROBLEM — R85.4: Status: RESOLVED | Noted: 2017-11-09 | Resolved: 2021-09-21

## 2021-09-21 PROBLEM — L57.0 ACTINIC KERATOSES: Status: RESOLVED | Noted: 2019-01-28 | Resolved: 2021-09-21

## 2021-09-21 PROBLEM — K57.32 DIVERTICULITIS OF LARGE INTESTINE WITHOUT PERFORATION OR ABSCESS WITHOUT BLEEDING: Status: RESOLVED | Noted: 2017-11-09 | Resolved: 2021-09-21

## 2021-09-21 PROCEDURE — 99215 OFFICE O/P EST HI 40 MIN: CPT | Performed by: INTERNAL MEDICINE

## 2021-09-21 NOTE — ASSESSMENT & PLAN NOTE
Lab Results   Component Value Date    LDL 89 03/03/2021    LDL 95 01/29/2020    LDL 85 07/29/2019    HDL 46 03/03/2021    TRIG 155 (H) 03/03/2021    CHOLHDLRATIO 3.52 03/03/2021        Continue simvastatin 20 mg and fenofibrate 145 mg daily.

## 2021-09-21 NOTE — PROGRESS NOTES
I N T E R N A L  M E D I C I N E  J U N O H  K I M,  M D      ENCOUNTER DATE:  09/21/2021    Juan F Sanz / 71 y.o. / male      CHIEF COMPLAINT / REASON FOR OFFICE VISIT     Establish Care (former shee)      ASSESSMENT & PLAN     Problem List Items Addressed This Visit        High    Benign essential hypertension - Primary (Chronic)    Current Assessment & Plan     Has been off HCTZ 25 mg for several months and home blood pressure readings are consistently < 120/80. However blood pressure is elevated today. Will need to verify accuracy of his blood pressure machine this/next week. May stay off HCTZ for now.     BP Readings from Last 3 Encounters:   09/21/21 164/76   03/03/21 138/88   01/29/20 144/82             Hyperlipidemia (Chronic)    Overview     Continue simvastatin 20 mg and fenofibrate 145mg tablet daily          Current Assessment & Plan     Lab Results   Component Value Date    LDL 89 03/03/2021    LDL 95 01/29/2020    LDL 85 07/29/2019    HDL 46 03/03/2021    TRIG 155 (H) 03/03/2021    CHOLHDLRATIO 3.52 03/03/2021        Continue simvastatin 20 mg and fenofibrate 145 mg daily.          Relevant Medications    simvastatin (ZOCOR) 20 MG tablet    fenofibrate (TRICOR) 145 MG tablet       Medium    Gastroesophageal reflux disease (Chronic)    Overview     Continue omeprazole 20 mg daily.          Current Assessment & Plan     2017 EGD was normal. Continue PPI.          Relevant Medications    omeprazole (priLOSEC) 20 MG capsule       Low    BPH without urinary obstruction (Chronic)    Overview     * Eliza    Continue follow-up with urologist.             No orders of the defined types were placed in this encounter.    No orders of the defined types were placed in this encounter.      SUMMARY/DISCUSSION  • I spent time: 40 minutes in direct care of this patient on this date of service. This time includes time spent by me in the following activities:preparing for the visit, obtaining and/or reviewing a  "separately obtained history, performing a medically appropriate examination and/or evaluation , reviewing tests, counseling and educating the patient/family/caregiver and documenting information in the medical record.       Next Appointment with me: 1/24/2022    Return in about 4 months (around 1/21/2022) for Hypertension.      VITAL SIGNS     Visit Vitals  /76 Comment: dr [email or web address]   Pulse 57   Temp 98 °F (36.7 °C)   Ht 170.2 cm (67\")   Wt 74.4 kg (164 lb)   SpO2 99%   BMI 25.69 kg/m²       BP Readings from Last 3 Encounters:   09/21/21 164/76   03/03/21 138/88   01/29/20 144/82     Wt Readings from Last 3 Encounters:   09/21/21 74.4 kg (164 lb)   03/03/21 76.9 kg (169 lb 9.6 oz)   01/29/20 76.4 kg (168 lb 6.4 oz)     Body mass index is 25.69 kg/m².      MEDICATIONS AT THE TIME OF OFFICE VISIT     Current Outpatient Medications on File Prior to Visit   Medication Sig   • cholecalciferol (VITAMIN D3) 1000 units tablet Take 1,000 Units by mouth Daily.   • dutasteride (AVODART) 0.5 MG capsule Take 0.5 mg by mouth Daily.   • fenofibrate (TRICOR) 145 MG tablet TAKE 1 TABLET BY MOUTH EVERY DAY    • omeprazole (priLOSEC) 20 MG capsule TAKE 1 CAPSULE BY MOUTH EVERY DAY    • simvastatin (ZOCOR) 20 MG tablet TAKE 1 TABLET BY MOUTH EVERY DAY    • tamsulosin (FLOMAX) 0.4 MG capsule 24 hr capsule Take 0.8 mg daily   • Loratadine (CLARITIN) 10 MG capsule Take 1 capsule by mouth.   • [DISCONTINUED] hydroCHLOROthiazide (HYDRODIURIL) 25 MG tablet Take 1 tablet by mouth Daily.  *HAS NOT TAKEN FOR SEVERAL MONTHS     No current facility-administered medications on file prior to visit.          HISTORY OF PRESENT ILLNESS     71-year-old male here to establish care.  He has longstanding history of chronic essential hypertension.  He has been on hydrochlorothiazide 25 mg for many years.  Due to symptoms of lightheadedness after starting medications for BPH he stopped taking hydrochlorothiazide 25 mg about 2 months ago.  Home blood " pressure readings show blood pressures consistently under 120/80.  He denies any headaches, vision change.  Denies history of heart disease stroke or kidney problems.  He also takes fenofibrate and simvastatin for dyslipidemia.  Denies myalgia side effects.  Takes omeprazole 20 mg daily for chronic GERD.  EGD in 2017 was normal.  He sees urology for BPH and currently takes tamsulosin dutasteride daily.      Patient Care Team:  Kd Howard MD as PCP - General (Internal Medicine)  Dionisio Kay MD as Consulting Physician (Urology)  Ronak Cobos PA (Physician Assistant)  Sudeep Mares MD as Consulting Physician (Ophthalmology)    REVIEW OF SYSTEMS     Review of Systems   Constitutional neg except per HPI   Resp neg  CV neg   GI neg   neg  MuSk left lower back pain history (seen by orthopedist)  Neuro neg  Psych neg     PHYSICAL EXAMINATION     Physical Exam  General: No acute distress  Eyes: No conjunctivitis, no icterus.   Neck: No palpable mass    Psych: Normal thought and judgment   Cardiovascular Rate: normal. Rhythm: regular. Heart sounds: normal. No carotid bruit.    Pulm/Chest: Effort normal, breath sounds normal.  Abdomen: Soft and nontender.   No lower extremity edema   Psych: Normal mood and affect. Normal thought and judgment.       REVIEWED DATA     Labs:     Lab Results   Component Value Date     03/03/2021    K 4.0 03/03/2021    CALCIUM 10.1 03/03/2021    AST 25 03/03/2021    ALT 12 03/03/2021    BUN 18 03/03/2021    CREATININE 1.13 03/03/2021    CREATININE 1.23 01/29/2020    CREATININE 1.33 (H) 07/29/2019    EGFRIFNONA 64 03/03/2021    EGFRIFAFRI 78 03/03/2021       No results found for: HGBA1C    Lab Results   Component Value Date    LDL 89 03/03/2021    LDL 95 01/29/2020    HDL 46 03/03/2021    TRIG 155 (H) 03/03/2021       Lab Results   Component Value Date    TSH 1.730 01/25/2018    FREET4 1.04 01/25/2018       Lab Results   Component Value Date    WBC 7.72 10/05/2017    HGB  13.2 (L) 10/05/2017     10/05/2017       No results found for: MICROALBUR        Imaging:     CT ABDOMEN AND PELVIS WITH CONTRAST     HISTORY: Suprapubic pain, history of diverticula.      TECHNIQUE: Axial CT images of the abdomen and pelvis were obtained  following administration of intravenous contrast. The patient was not  given oral contrast Coronal and sagittal reformats were obtained.     COMPARISON: None.     FINDINGS: There is short segment circumferential wall thickening of the  sigmoid colon noted within the midline pelvis associated with  surrounding pericolic stranding. Colonic diverticulosis is present. No  extraluminal air or fluid collection is seen. There is no evidence of  bowel obstruction. The appendix is normal.     The liver demonstrates normal attenuation. The spleen, pancreas and the  gallbladder is normal. Bilateral adrenal glands are normal. Both kidneys  are normal in size and attenuation. No renal calculi or hydronephrosis.  Bilateral ureters demonstrate normal caliber. The urinary bladder is  partially distended with circumferential wall thickening. The prostate  gland is enlarged. Polypoidal lesion seen within the midline base of the  bladder likely represents the hypertrophic median lobe. There are  bilateral L5 pars defects with 13 mm anterolisthesis of L5 on S1 with  extensive degenerative change.     Mild circumferential wall thickening of the distal esophagus may  represent esophagitis.     IMPRESSION:  1. Short segment circumferential wall thickening of the sigmoid  associated with pericolic stranding. These findings suggest mild acute  sigmoid diverticulitis and/or colitis.  2. Diffuse wall thickening of the urinary bladder that may be secondary  to bladder outlet obstruction. Polypoidal mass projecting within the  midline posteriorly likely represents the enlarged median lobe of the  prostate gland. Urology consult is recommended.  3. Circumferential wall thickening of the  distal esophagus, possibly  related to esophagitis. Upper GI endoscopy may be obtained.     These findings were discussed with Yadira Sheets by telephone at 2 PM on  10/13/2017.     Radiation dose reduction techniques were utilized, including automated  exposure control and exposure modulation based on body size.     This report was finalized on 10/16/2017      Medical Tests:     2017 EGD normal esophagus       Summary of old records / correspondence / consultant report:           Request outside records:             *Examiner was wearing KN95 mask and eye protection during the entire duration of the visit. Patient was masked the entire time. Minimum social distance of 6 ft maintained entire visit except if physical contact was necessary as documented.     **Dragon Disclaimer: Much of this encounter note is an electronic transcription/translation of spoken language to printed text. The electronic translation of spoken language may permit erroneous, or at times, nonsensical words or phrases to be inadvertently transcribed. Although I have reviewed the note for such errors, some may still exist.       Template created by Walter Howard MD

## 2021-09-21 NOTE — ASSESSMENT & PLAN NOTE
Has been off HCTZ 25 mg for several months and home blood pressure readings are consistently < 120/80. However blood pressure is elevated today. Will need to verify accuracy of his blood pressure machine this/next week. May stay off HCTZ for now.     BP Readings from Last 3 Encounters:   09/21/21 164/76   03/03/21 138/88   01/29/20 144/82

## 2021-09-22 ENCOUNTER — PATIENT ROUNDING (BHMG ONLY) (OUTPATIENT)
Dept: INTERNAL MEDICINE | Age: 71
End: 2021-09-22

## 2021-09-22 NOTE — PROGRESS NOTES
September 22, 2021    Hello, may I speak with Juan F Sanz?    My name is darren practice manager    I am  with [facility] [address]  [facility]  [facility] [address] [phone].    Before we get started may I verify your date of birth? 1950    I am calling to officially welcome you to our practice and ask about your recent visit. Is this a good time to talk? yes    Tell me about your visit with us. What things went well?  well       We're always looking for ways to make our patients' experiences even better. Do you have recommendations on ways we may improve?  no    Overall were you satisfied with your first visit to our practice? yes       I appreciate you taking the time to speak with me today. Is there anything else I can do for you? no      Thank you, and have a great day.

## 2021-09-24 ENCOUNTER — CLINICAL SUPPORT (OUTPATIENT)
Dept: INTERNAL MEDICINE | Age: 71
End: 2021-09-24

## 2021-09-24 ENCOUNTER — TELEPHONE (OUTPATIENT)
Dept: INTERNAL MEDICINE | Age: 71
End: 2021-09-24

## 2021-09-24 VITALS — DIASTOLIC BLOOD PRESSURE: 91 MMHG | SYSTOLIC BLOOD PRESSURE: 170 MMHG

## 2022-01-24 ENCOUNTER — OFFICE VISIT (OUTPATIENT)
Dept: INTERNAL MEDICINE | Age: 72
End: 2022-01-24

## 2022-01-24 VITALS
WEIGHT: 163 LBS | HEIGHT: 67 IN | HEART RATE: 58 BPM | OXYGEN SATURATION: 97 % | SYSTOLIC BLOOD PRESSURE: 136 MMHG | DIASTOLIC BLOOD PRESSURE: 82 MMHG | BODY MASS INDEX: 25.58 KG/M2 | TEMPERATURE: 97.7 F

## 2022-01-24 DIAGNOSIS — K21.9 GASTROESOPHAGEAL REFLUX DISEASE, UNSPECIFIED WHETHER ESOPHAGITIS PRESENT: Chronic | ICD-10-CM

## 2022-01-24 DIAGNOSIS — E78.2 MIXED HYPERLIPIDEMIA: Chronic | ICD-10-CM

## 2022-01-24 DIAGNOSIS — I10 BENIGN ESSENTIAL HYPERTENSION: Primary | Chronic | ICD-10-CM

## 2022-01-24 PROCEDURE — 99214 OFFICE O/P EST MOD 30 MIN: CPT | Performed by: INTERNAL MEDICINE

## 2022-01-24 NOTE — PROGRESS NOTES
"    I N T E R N A L  M E D I C I N E  J U N O H  K I M,  M D      ENCOUNTER DATE:  01/24/2022    Juan F Sanz / 71 y.o. / male      CHIEF COMPLAINT / REASON FOR OFFICE VISIT     Hypertension      ASSESSMENT & PLAN     Problem List Items Addressed This Visit        High    Benign essential hypertension - Primary (Chronic)    Current Assessment & Plan     Blood pressure is overall stable. Home blood pressure machine correlates with readings here. Continue low sodium diet and monitoring. No medication currently.     BP Readings from Last 3 Encounters:   01/24/22 136/82   09/24/21 170/91   09/21/21 164/76             Relevant Orders    Comprehensive Metabolic Panel    Hyperlipidemia (Chronic)    Overview     Continue simvastatin 20 mg and fenofibrate 145mg tablet daily          Relevant Medications    simvastatin (ZOCOR) 20 MG tablet    fenofibrate (TRICOR) 145 MG tablet    Other Relevant Orders    Comprehensive Metabolic Panel    Lipid Panel With / Chol / HDL Ratio       Medium    Gastroesophageal reflux disease (Chronic)    Overview     Continue omeprazole 20 mg daily.          Relevant Medications    omeprazole (priLOSEC) 20 MG capsule    Other Relevant Orders    CBC & Differential        Orders Placed This Encounter   Procedures   • Comprehensive Metabolic Panel   • Lipid Panel With / Chol / HDL Ratio   • CBC & Differential     No orders of the defined types were placed in this encounter.      SUMMARY/DISCUSSION  • Recommended flu and COVID-19 booster.   • Recommended Pneumovax (he defers today)      Next Appointment with me: Visit date not found    Return in about 6 months (around 7/24/2022) for Reassess chronic medical problems.      VITAL SIGNS     Visit Vitals  /82 (BP Location: Left arm)   Pulse 58   Temp 97.7 °F (36.5 °C)   Ht 170.2 cm (67\")   Wt 73.9 kg (163 lb)   SpO2 97%   BMI 25.53 kg/m²       BP Readings from Last 3 Encounters:   01/24/22 136/82   09/24/21 170/91   09/21/21 164/76     Wt Readings " from Last 3 Encounters:   01/24/22 73.9 kg (163 lb)   09/21/21 74.4 kg (164 lb)   03/03/21 76.9 kg (169 lb 9.6 oz)     Body mass index is 25.53 kg/m².      MEDICATIONS AT THE TIME OF OFFICE VISIT     Current Outpatient Medications on File Prior to Visit   Medication Sig   • cholecalciferol (VITAMIN D3) 1000 units tablet Take 1,000 Units by mouth Daily.   • dutasteride (AVODART) 0.5 MG capsule Take 0.5 mg by mouth Daily.   • fenofibrate (TRICOR) 145 MG tablet TAKE 1 TABLET BY MOUTH EVERY DAY    • omeprazole (priLOSEC) 20 MG capsule TAKE 1 CAPSULE BY MOUTH EVERY DAY    • simvastatin (ZOCOR) 20 MG tablet TAKE 1 TABLET BY MOUTH EVERY DAY    • tamsulosin (FLOMAX) 0.4 MG capsule 24 hr capsule Take 0.8 mg daily       HISTORY OF PRESENT ILLNESS     Home blood pressure readings are reviewed which show good control of blood pressure without medication. His blood pressure meter from home correlates to readings at our office today. He is on fenofibrate and simvastatin for hyperlipidemia without significant problems. On omeprazole 20 mg daily without worsening GERD symptoms. Sees urologist for BPH and is on Avodart and tamsulosin.      Patient Care Team:  Kd Howard MD as PCP - General (Internal Medicine)  Dionisio Kay MD as Consulting Physician (Urology)  Ronak Cobos PA (Physician Assistant)  Sudeep Mares MD as Consulting Physician (Ophthalmology)    REVIEW OF SYSTEMS     Review of Systems   Constitutional neg except per HPI   Resp neg  CV neg   GI neg    negative for change     PHYSICAL EXAMINATION     Physical Exam  General: No acute distress  Psych: Normal thought and judgment   Cardiovascular Rate: normal. Rhythm: regular. Heart sounds: normal.   Pulm/Chest: Effort normal, breath sounds normal.  No carotid bruit.        REVIEWED DATA     Labs:     Lab Results   Component Value Date     03/03/2021    K 4.0 03/03/2021    CALCIUM 10.1 03/03/2021    AST 25 03/03/2021    ALT 12 03/03/2021    BUN 18  03/03/2021    CREATININE 1.13 03/03/2021    CREATININE 1.23 01/29/2020    CREATININE 1.33 (H) 07/29/2019    EGFRIFNONA 64 03/03/2021    EGFRIFAFRI 78 03/03/2021       No results found for: HGBA1C  Lab Results   Component Value Date    LDL 89 03/03/2021    LDL 95 01/29/2020    HDL 46 03/03/2021    TRIG 155 (H) 03/03/2021       Lab Results   Component Value Date    TSH 1.730 01/25/2018    FREET4 1.04 01/25/2018       Lab Results   Component Value Date    WBC 7.72 10/05/2017    HGB 13.2 (L) 10/05/2017     10/05/2017       No results found for: MICROALBUR        Imaging:           Medical Tests:           Summary of old records / correspondence / consultant report:           Request outside records:             *Examiner was wearing KN95 mask and eye protection during the entire duration of the visit. Patient was masked the entire time. Minimum social distance of 6 ft maintained entire visit except if physical contact was necessary as documented.     **Dragon Disclaimer: Much of this encounter note is an electronic transcription/translation of spoken language to printed text. The electronic translation of spoken language may permit erroneous, or at times, nonsensical words or phrases to be inadvertently transcribed. Although I have reviewed the note for such errors, some may still exist.       Template created by Walter Howard MD

## 2022-01-24 NOTE — PATIENT INSTRUCTIONS
** IMPORTANT MESSAGE FROM DR. VELAZCO **    In our office, your satisfaction is VERY important to us.     You may receive a survey from [facility] by mail or E-mail for you to provide feedback about your visit. This information is invaluable for me to know what we can do to improve our services.     I ask that you please take a few minutes to complete the survey and let us know how we are doing in serving your needs. (You may receive the survey more than once for multiple visits)    Thank You !    Dr. Velazco & Staff    _________________________________________________________________________________________________________________________      ** ADDITIONAL INSTRUCTION / REMINDERS FROM DR. VELAZCO **

## 2022-01-24 NOTE — ASSESSMENT & PLAN NOTE
Blood pressure is overall stable. Home blood pressure machine correlates with readings here. Continue low sodium diet and monitoring. No medication currently.     BP Readings from Last 3 Encounters:   01/24/22 136/82   09/24/21 170/91   09/21/21 164/76

## 2022-01-25 LAB
ALBUMIN SERPL-MCNC: 4.6 G/DL (ref 3.7–4.7)
ALBUMIN/GLOB SERPL: 2.1 {RATIO} (ref 1.2–2.2)
ALP SERPL-CCNC: 47 IU/L (ref 44–121)
ALT SERPL-CCNC: 15 IU/L (ref 0–44)
AST SERPL-CCNC: 30 IU/L (ref 0–40)
BASOPHILS # BLD AUTO: 0 X10E3/UL (ref 0–0.2)
BASOPHILS NFR BLD AUTO: 1 %
BILIRUB SERPL-MCNC: 0.5 MG/DL (ref 0–1.2)
BUN SERPL-MCNC: 19 MG/DL (ref 8–27)
BUN/CREAT SERPL: 16 (ref 10–24)
CALCIUM SERPL-MCNC: 9.7 MG/DL (ref 8.6–10.2)
CHLORIDE SERPL-SCNC: 104 MMOL/L (ref 96–106)
CHOLEST SERPL-MCNC: 169 MG/DL (ref 100–199)
CHOLEST/HDLC SERPL: 3.3 RATIO (ref 0–5)
CO2 SERPL-SCNC: 27 MMOL/L (ref 20–29)
CREAT SERPL-MCNC: 1.21 MG/DL (ref 0.76–1.27)
EOSINOPHIL # BLD AUTO: 0.1 X10E3/UL (ref 0–0.4)
EOSINOPHIL NFR BLD AUTO: 3 %
ERYTHROCYTE [DISTWIDTH] IN BLOOD BY AUTOMATED COUNT: 12.2 % (ref 11.6–15.4)
GLOBULIN SER CALC-MCNC: 2.2 G/DL (ref 1.5–4.5)
GLUCOSE SERPL-MCNC: 92 MG/DL (ref 65–99)
HCT VFR BLD AUTO: 43.9 % (ref 37.5–51)
HDLC SERPL-MCNC: 52 MG/DL
HGB BLD-MCNC: 14.6 G/DL (ref 13–17.7)
IMM GRANULOCYTES # BLD AUTO: 0 X10E3/UL (ref 0–0.1)
IMM GRANULOCYTES NFR BLD AUTO: 0 %
LDLC SERPL CALC-MCNC: 95 MG/DL (ref 0–99)
LYMPHOCYTES # BLD AUTO: 1.4 X10E3/UL (ref 0.7–3.1)
LYMPHOCYTES NFR BLD AUTO: 29 %
MCH RBC QN AUTO: 29.1 PG (ref 26.6–33)
MCHC RBC AUTO-ENTMCNC: 33.3 G/DL (ref 31.5–35.7)
MCV RBC AUTO: 88 FL (ref 79–97)
MONOCYTES # BLD AUTO: 0.4 X10E3/UL (ref 0.1–0.9)
MONOCYTES NFR BLD AUTO: 8 %
NEUTROPHILS # BLD AUTO: 2.9 X10E3/UL (ref 1.4–7)
NEUTROPHILS NFR BLD AUTO: 59 %
PLATELET # BLD AUTO: 243 X10E3/UL (ref 150–450)
POTASSIUM SERPL-SCNC: 4.1 MMOL/L (ref 3.5–5.2)
PROT SERPL-MCNC: 6.8 G/DL (ref 6–8.5)
RBC # BLD AUTO: 5.01 X10E6/UL (ref 4.14–5.8)
SODIUM SERPL-SCNC: 144 MMOL/L (ref 134–144)
TRIGL SERPL-MCNC: 124 MG/DL (ref 0–149)
VLDLC SERPL CALC-MCNC: 22 MG/DL (ref 5–40)
WBC # BLD AUTO: 4.8 X10E3/UL (ref 3.4–10.8)

## 2022-07-26 ENCOUNTER — OFFICE VISIT (OUTPATIENT)
Dept: INTERNAL MEDICINE | Age: 72
End: 2022-07-26

## 2022-07-26 VITALS
OXYGEN SATURATION: 98 % | HEIGHT: 67 IN | HEART RATE: 72 BPM | DIASTOLIC BLOOD PRESSURE: 72 MMHG | BODY MASS INDEX: 26.42 KG/M2 | WEIGHT: 168.3 LBS | TEMPERATURE: 97.5 F | SYSTOLIC BLOOD PRESSURE: 138 MMHG

## 2022-07-26 DIAGNOSIS — K21.9 GASTROESOPHAGEAL REFLUX DISEASE, UNSPECIFIED WHETHER ESOPHAGITIS PRESENT: Chronic | ICD-10-CM

## 2022-07-26 DIAGNOSIS — I10 BENIGN ESSENTIAL HYPERTENSION: Chronic | ICD-10-CM

## 2022-07-26 DIAGNOSIS — E78.2 MIXED HYPERLIPIDEMIA: Primary | Chronic | ICD-10-CM

## 2022-07-26 PROCEDURE — 99214 OFFICE O/P EST MOD 30 MIN: CPT | Performed by: INTERNAL MEDICINE

## 2022-07-26 NOTE — PATIENT INSTRUCTIONS
** IMPORTANT MESSAGE FROM DR. VELAZCO **    In our office, your satisfaction is VERY important to us.     You may receive a survey from [facility] by mail or E-mail for you to provide feedback about your visit. This information is invaluable for me to know what we can do to improve our services.     I ask that you please take a few minutes to complete the survey and let us know how we are doing in serving your needs. (You may receive the survey more than once for multiple visits)    Thank You !    Dr. Velazco    _________________________________________________________________________________________________________________________      ** ADDITIONAL INSTRUCTION / REMINDERS FROM DR. VELAZCO **

## 2022-07-26 NOTE — PROGRESS NOTES
"    I N T E R N A L  M E D I C I N E  J U N O H  K I M,  M D      ENCOUNTER DATE:  07/26/2022    Juan F Sanz / 72 y.o. / male      CHIEF COMPLAINT / REASON FOR OFFICE VISIT     Hypertension, Hyperlipidemia, and Gastroesophageal reflux disease      ASSESSMENT & PLAN     Problem List Items Addressed This Visit        High    Hyperlipidemia - Primary (Chronic)    Overview     Continue simvastatin 20 mg and fenofibrate 145mg tablet daily            Relevant Medications    simvastatin (ZOCOR) 20 MG tablet    fenofibrate (TRICOR) 145 MG tablet       Medium    Benign essential hypertension (Chronic)    Overview     Maintain low-sodium diet and continue to monitor blood pressure at home.           Gastroesophageal reflux disease (Chronic)    Overview     Continue omeprazole 20 mg daily.            Relevant Medications    omeprazole (priLOSEC) 20 MG capsule        No orders of the defined types were placed in this encounter.    No orders of the defined types were placed in this encounter.      SUMMARY/DISCUSSION  • Patient defers COVID-19 booster shot.  I recommended PCV 20 but he would like to think about this first.      Next Appointment with me: Visit date not found    Return in about 6 months (around 1/26/2023) for Hypertension, Hyperlipidemia.      VITAL SIGNS     Vitals:    07/26/22 1031   BP: 138/72   BP Location: Left arm   Pulse: 72   Temp: 97.5 °F (36.4 °C)   TempSrc: Temporal   SpO2: 98%   Weight: 76.3 kg (168 lb 4.8 oz)   Height: 170.2 cm (67\")       BP Readings from Last 3 Encounters:   07/26/22 138/72   01/24/22 136/82   09/24/21 170/91     Wt Readings from Last 3 Encounters:   07/26/22 76.3 kg (168 lb 4.8 oz)   01/24/22 73.9 kg (163 lb)   09/21/21 74.4 kg (164 lb)     Body mass index is 26.36 kg/m².    Blood pressure readings recorded on patient flowsheet:  No flowsheet data found.       MEDICATIONS AT THE TIME OF OFFICE VISIT     Current Outpatient Medications on File Prior to Visit   Medication Sig   • " cholecalciferol (VITAMIN D3) 1000 units tablet Take 1,000 Units by mouth Daily.   • dutasteride (AVODART) 0.5 MG capsule Take 0.5 mg by mouth Daily.   • fenofibrate (TRICOR) 145 MG tablet TAKE 1 TABLET BY MOUTH EVERY DAY    • omeprazole (priLOSEC) 20 MG capsule TAKE 1 CAPSULE BY MOUTH EVERY DAY    • simvastatin (ZOCOR) 20 MG tablet TAKE 1 TABLET BY MOUTH EVERY DAY    • tamsulosin (FLOMAX) 0.4 MG capsule 24 hr capsule Take 0.8 mg daily     No current facility-administered medications on file prior to visit.          HISTORY OF PRESENT ILLNESS     Cholesterol remains stable on simvastatin 20 mg and fenofibrate 145 mg.  Denies any significant side effects.  His kidney function remains stable.  He has benign essential hypertension and blood pressure is consistently less than 140/90 at home without medication.  He is taking omeprazole 20 mg daily for GERD without dysphagia symptoms.      Patient Care Team:  Kd Howard MD as PCP - General (Internal Medicine)  Dionisio Kay MD as Consulting Physician (Urology)  Ronak Cobos PA (Physician Assistant)  Sudeep Mares MD as Consulting Physician (Ophthalmology)    REVIEW OF SYSTEMS     Review of Systems   Constitutional neg except per HPI   Resp neg  CV neg   GI negative     PHYSICAL EXAMINATION     Physical Exam  General: No acute distress  Psych: Normal thought and judgment   Cardiovascular Rate: normal. Rhythm: regular. Heart sounds: normal.   Pulm/Chest: Effort normal, breath sounds normal.       REVIEWED DATA     Labs:     Lab Results   Component Value Date     01/24/2022    K 4.1 01/24/2022    CALCIUM 9.7 01/24/2022    AST 30 01/24/2022    ALT 15 01/24/2022    BUN 19 01/24/2022    CREATININE 1.21 01/24/2022    CREATININE 1.13 03/03/2021    CREATININE 1.23 01/29/2020    EGFRIFNONA 59 (L) 01/24/2022    EGFRIFAFRI 69 01/24/2022       No results found for: HGBA1C    Lab Results   Component Value Date    LDL 95 01/24/2022    LDL 89 03/03/2021    LDL 95  01/29/2020    HDL 52 01/24/2022    HDL 46 03/03/2021    TRIG 124 01/24/2022    TRIG 155 (H) 03/03/2021       Lab Results   Component Value Date    TSH 1.730 01/25/2018    FREET4 1.04 01/25/2018       Lab Results   Component Value Date    WBC 4.8 01/24/2022    HGB 14.6 01/24/2022     01/24/2022       No results found for: MALBCRERATIO       Imaging:           Medical Tests:           Summary of old records / correspondence / consultant report:           Request outside records:             *Examiner was wearing KN95 mask and eye protection during the entire duration of the visit. Patient was masked the entire time. Minimum social distance of 6 ft maintained entire visit except if physical contact was necessary as documented.       Template created by Walter Howard MD

## 2022-08-23 DIAGNOSIS — E78.2 MIXED HYPERLIPIDEMIA: ICD-10-CM

## 2022-08-23 DIAGNOSIS — K21.9 GASTROESOPHAGEAL REFLUX DISEASE: ICD-10-CM

## 2022-08-23 RX ORDER — OMEPRAZOLE 20 MG/1
CAPSULE, DELAYED RELEASE ORAL
Qty: 90 CAPSULE | Refills: 3 | Status: SHIPPED | OUTPATIENT
Start: 2022-08-23

## 2022-08-23 RX ORDER — SIMVASTATIN 20 MG
TABLET ORAL
Qty: 90 TABLET | Refills: 3 | Status: SHIPPED | OUTPATIENT
Start: 2022-08-23

## 2022-08-23 RX ORDER — FENOFIBRATE 145 MG/1
TABLET, COATED ORAL
Qty: 90 TABLET | Refills: 3 | Status: SHIPPED | OUTPATIENT
Start: 2022-08-23

## 2023-01-26 ENCOUNTER — OFFICE VISIT (OUTPATIENT)
Dept: INTERNAL MEDICINE | Age: 73
End: 2023-01-26
Payer: MEDICARE

## 2023-01-26 VITALS
TEMPERATURE: 97 F | BODY MASS INDEX: 26.53 KG/M2 | OXYGEN SATURATION: 100 % | DIASTOLIC BLOOD PRESSURE: 88 MMHG | WEIGHT: 169 LBS | HEIGHT: 67 IN | SYSTOLIC BLOOD PRESSURE: 174 MMHG | HEART RATE: 57 BPM

## 2023-01-26 DIAGNOSIS — K21.9 GASTROESOPHAGEAL REFLUX DISEASE, UNSPECIFIED WHETHER ESOPHAGITIS PRESENT: Chronic | ICD-10-CM

## 2023-01-26 DIAGNOSIS — E78.2 MIXED HYPERLIPIDEMIA: Chronic | ICD-10-CM

## 2023-01-26 DIAGNOSIS — I10 BENIGN ESSENTIAL HYPERTENSION: Primary | Chronic | ICD-10-CM

## 2023-01-26 PROCEDURE — 99214 OFFICE O/P EST MOD 30 MIN: CPT | Performed by: INTERNAL MEDICINE

## 2023-01-26 NOTE — ASSESSMENT & PLAN NOTE
Blood pressure is high today.  Blood pressure is reportedly within normal at home.  Send blood pressure readings from home in 2 weeks.  Schedule blood pressure machine accuracy check with MA next week.    BP Readings from Last 3 Encounters:   01/26/23 174/88   07/26/22 138/72   01/24/22 136/82

## 2023-01-26 NOTE — PROGRESS NOTES
"    I N T E R N A L  M E D I C I N E    J U N O H  K I M,  M D      ENCOUNTER DATE:  01/26/2023    Juan F Sanz / 72 y.o. / male    CHIEF COMPLAINT / REASON FOR OFFICE VISIT     Hypertension and Hyperlipidemia      ASSESSMENT & PLAN     Problem List Items Addressed This Visit        High    Hyperlipidemia (Chronic)    Overview     Continue simvastatin 20 mg and fenofibrate 145mg tablet daily          Relevant Medications    fenofibrate (TRICOR) 145 MG tablet    simvastatin (ZOCOR) 20 MG tablet    Other Relevant Orders    Comprehensive Metabolic Panel    Lipid Panel With / Chol / HDL Ratio       Medium    Benign essential hypertension - Primary (Chronic)    Overview     Maintain low-sodium diet and continue to monitor blood pressure at home.         Current Assessment & Plan     Blood pressure is high today.  Blood pressure is reportedly within normal at home.  Send blood pressure readings from home in 2 weeks.  Schedule blood pressure machine accuracy check with MA next week.    BP Readings from Last 3 Encounters:   01/26/23 174/88   07/26/22 138/72   01/24/22 136/82             Relevant Orders    Comprehensive Metabolic Panel    Gastroesophageal reflux disease (Chronic)    Overview     Continue omeprazole 20 mg daily.          Relevant Medications    omeprazole (priLOSEC) 20 MG capsule     Orders Placed This Encounter   Procedures   • Comprehensive Metabolic Panel   • Lipid Panel With / Chol / HDL Ratio     No orders of the defined types were placed in this encounter.      SUMMARY/DISCUSSION  •       Next Appointment with me: Visit date not found    Return in about 6 months (around 7/26/2023) for COMBINED AWV AND MEDICAL F/U (30 MIN).      VITAL SIGNS     Vitals:    01/26/23 1045 01/26/23 1105   BP: 164/90 174/88   Pulse: 57    Temp: 97 °F (36.1 °C)    SpO2: 100%    Weight: 76.7 kg (169 lb)    Height: 170.2 cm (67\")        BP Readings from Last 3 Encounters:   01/26/23 174/88   07/26/22 138/72   01/24/22 136/82 "     Wt Readings from Last 3 Encounters:   01/26/23 76.7 kg (169 lb)   07/26/22 76.3 kg (168 lb 4.8 oz)   01/24/22 73.9 kg (163 lb)     Body mass index is 26.47 kg/m².    Blood pressure readings recorded on patient flowsheet:  No flowsheet data found.       MEDICATIONS AT THE TIME OF OFFICE VISIT     Current Outpatient Medications on File Prior to Visit   Medication Sig   • cholecalciferol (VITAMIN D3) 1000 units tablet Take 1,000 Units by mouth Daily.   • dutasteride (AVODART) 0.5 MG capsule Take 0.5 mg by mouth Daily.   • fenofibrate (TRICOR) 145 MG tablet TAKE 1 TABLET BY MOUTH EVERY DAY   • omeprazole (priLOSEC) 20 MG capsule TAKE 1 CAPSULE BY MOUTH EVERY DAY   • simvastatin (ZOCOR) 20 MG tablet TAKE 1 TABLET BY MOUTH EVERY DAY   • tamsulosin (FLOMAX) 0.4 MG capsule 24 hr capsule Take 0.8 mg daily     No current facility-administered medications on file prior to visit.          HISTORY OF PRESENT ILLNESS     The patient presents today for follow-up of hypertension. He reports he monitors his blood pressure at home and yesterday it was 128 or 129/69 mmHg and the highest reading he has gotten is 138/83 mmHg over the past month, however his blood pressure is elevated in clinic today;  upon recheck it is 174/88 mmHg. He was previously on hydrochlorothiazide for hypertension in 2021, his blood pressure began to stabilize and he discontinued the medication. He reports a family history of high blood pressure in his mother and siblings. He states he is limiting his salt intake, eating the occasional potato chip. He has put on six pounds in the past year, he states he is watching his weight and working on keeping the weight gain minimal. He denies any chest pain or shortness of breath.       Patient Care Team:  Kd Howard MD as PCP - General (Internal Medicine)  Dionisio Kay MD as Consulting Physician (Urology)  Ronak Cobos PA (Physician Assistant)  Sudeep Mares MD as Consulting Physician  (Ophthalmology)    REVIEW OF SYSTEMS     Review of Systems   Constitutional neg except per HPI   Resp neg  CV neg   Neuro negative     PHYSICAL EXAMINATION     Physical Exam  General: No acute distress  Psych: Normal thought and judgment   Cardiovascular Rate: normal. Rhythm: regular. Heart sounds: normal   Pulm/Chest: Effort normal, breath sounds normal.        REVIEWED DATA     Labs:     Lab Results   Component Value Date     01/24/2022    K 4.1 01/24/2022    CALCIUM 9.7 01/24/2022    AST 30 01/24/2022    ALT 15 01/24/2022    BUN 19 01/24/2022    CREATININE 1.21 01/24/2022    CREATININE 1.13 03/03/2021    CREATININE 1.23 01/29/2020    EGFRIFNONA 59 (L) 01/24/2022    EGFRIFAFRI 69 01/24/2022       No results found for: HGBA1C    Lab Results   Component Value Date    LDL 95 01/24/2022    LDL 89 03/03/2021    LDL 95 01/29/2020    HDL 52 01/24/2022    HDL 46 03/03/2021    TRIG 124 01/24/2022    TRIG 155 (H) 03/03/2021       Lab Results   Component Value Date    TSH 1.730 01/25/2018    FREET4 1.04 01/25/2018       Lab Results   Component Value Date    WBC 4.8 01/24/2022    HGB 14.6 01/24/2022     01/24/2022       No results found for: MALBCRERATIO       Imaging:           Medical Tests:           Summary of old records / correspondence / consultant report:           Request outside records:             *Examiner was wearing [id number] mask and eye protection during the entire duration of the visit. Patient was masked the entire time. Minimum social distance of 6 ft maintained entire visit except if physical contact was necessary as documented.       Template created by Walter Howard MD     Transcribed from ambient dictation for Kd Howard MD by Carolin Washington.  01/26/23   12:34 EST    Patient or patient representative verbalized consent to the visit recording.  I have personally performed the services described in this document as transcribed by the above individual, and it is both accurate and complete.  Kd Treadwell  MD Anita  1/26/2023  12:59 EST

## 2023-01-27 LAB
ALBUMIN SERPL-MCNC: 4.6 G/DL (ref 3.5–5.2)
ALBUMIN/GLOB SERPL: 2.4 G/DL
ALP SERPL-CCNC: 42 U/L (ref 39–117)
ALT SERPL-CCNC: 13 U/L (ref 1–41)
AST SERPL-CCNC: 21 U/L (ref 1–40)
BILIRUB SERPL-MCNC: 0.4 MG/DL (ref 0–1.2)
BUN SERPL-MCNC: 16 MG/DL (ref 8–23)
BUN/CREAT SERPL: 15.2 (ref 7–25)
CALCIUM SERPL-MCNC: 9.4 MG/DL (ref 8.6–10.5)
CHLORIDE SERPL-SCNC: 109 MMOL/L (ref 98–107)
CHOLEST SERPL-MCNC: 164 MG/DL (ref 0–200)
CHOLEST/HDLC SERPL: 2.93 {RATIO}
CO2 SERPL-SCNC: 22.4 MMOL/L (ref 22–29)
CREAT SERPL-MCNC: 1.05 MG/DL (ref 0.76–1.27)
EGFRCR SERPLBLD CKD-EPI 2021: 75 ML/MIN/1.73
GLOBULIN SER CALC-MCNC: 1.9 GM/DL
GLUCOSE SERPL-MCNC: 90 MG/DL (ref 65–99)
HDLC SERPL-MCNC: 56 MG/DL (ref 40–60)
LDLC SERPL CALC-MCNC: 91 MG/DL (ref 0–100)
POTASSIUM SERPL-SCNC: 4 MMOL/L (ref 3.5–5.2)
PROT SERPL-MCNC: 6.5 G/DL (ref 6–8.5)
SODIUM SERPL-SCNC: 142 MMOL/L (ref 136–145)
TRIGL SERPL-MCNC: 94 MG/DL (ref 0–150)
VLDLC SERPL CALC-MCNC: 17 MG/DL (ref 5–40)

## 2023-02-02 ENCOUNTER — CLINICAL SUPPORT (OUTPATIENT)
Dept: INTERNAL MEDICINE | Age: 73
End: 2023-02-02
Payer: MEDICARE

## 2023-02-02 DIAGNOSIS — I10 HYPERTENSION, UNSPECIFIED TYPE: Primary | ICD-10-CM

## 2023-02-02 PROCEDURE — 99211 OFF/OP EST MAY X REQ PHY/QHP: CPT | Performed by: INTERNAL MEDICINE

## 2023-07-26 ENCOUNTER — OFFICE VISIT (OUTPATIENT)
Dept: INTERNAL MEDICINE | Age: 73
End: 2023-07-26
Payer: MEDICARE

## 2023-07-26 VITALS
DIASTOLIC BLOOD PRESSURE: 90 MMHG | OXYGEN SATURATION: 98 % | WEIGHT: 170 LBS | SYSTOLIC BLOOD PRESSURE: 170 MMHG | HEIGHT: 67 IN | HEART RATE: 63 BPM | TEMPERATURE: 97.3 F | BODY MASS INDEX: 26.68 KG/M2

## 2023-07-26 DIAGNOSIS — R03.0 WHITE COAT SYNDROME WITHOUT HYPERTENSION: Chronic | ICD-10-CM

## 2023-07-26 DIAGNOSIS — K21.9 GASTROESOPHAGEAL REFLUX DISEASE, UNSPECIFIED WHETHER ESOPHAGITIS PRESENT: Chronic | ICD-10-CM

## 2023-07-26 DIAGNOSIS — E78.2 MIXED HYPERLIPIDEMIA: Chronic | ICD-10-CM

## 2023-07-26 DIAGNOSIS — N40.0 BPH WITHOUT URINARY OBSTRUCTION: Chronic | ICD-10-CM

## 2023-07-26 DIAGNOSIS — Z00.00 MEDICARE ANNUAL WELLNESS VISIT, INITIAL: Primary | ICD-10-CM

## 2023-07-26 PROCEDURE — 1160F RVW MEDS BY RX/DR IN RCRD: CPT | Performed by: INTERNAL MEDICINE

## 2023-07-26 PROCEDURE — 1159F MED LIST DOCD IN RCRD: CPT | Performed by: INTERNAL MEDICINE

## 2023-07-26 PROCEDURE — 99214 OFFICE O/P EST MOD 30 MIN: CPT | Performed by: INTERNAL MEDICINE

## 2023-07-26 PROCEDURE — 1170F FXNL STATUS ASSESSED: CPT | Performed by: INTERNAL MEDICINE

## 2023-07-26 PROCEDURE — G0438 PPPS, INITIAL VISIT: HCPCS | Performed by: INTERNAL MEDICINE

## 2023-07-26 PROCEDURE — 96160 PT-FOCUSED HLTH RISK ASSMT: CPT | Performed by: INTERNAL MEDICINE

## 2023-07-26 NOTE — PROGRESS NOTES
I N T E R N A L  M E D I C I N E    J U N O H  K I M,  M D      ENCOUNTER DATE:  07/26/2023    Juan F Sanz / 73 y.o. / male    MEDICARE ANNUAL WELLNESS VISIT       Chief Complaint:  AWV     Patient's general assessment of his health since a year ago:     - Compared to one year ago, he feels his physical health is:   [x] About the same  [] Better  [] Little worse  [] Significantly worse  []     - Compared to one year ago, he feels his mental health is   [x] About the same  [] Better  [] Little worse  [] Significantly worse  []     HPI for other active medical problems:     Patient denies any major changes with his health at this time. He confirms taking omeprazole for reflux, fenofibrate, simvastatin, Avodart for benign prostatic hyperplasia, Flomax, and vitamin D. He is following up with urology, ophthalmology, and dermatology. He follows up with urology for benign prostatic hyperplasia and urination difficulty. He denies any concerns for cancer. His PSA level from 2015 and 2016 were normal. He has never had any vascular screening. He has completed a stress test in the past. His last colonoscopy was in 2017 with noted diverticulosis and hemorrhoids. Patient enjoys bicycling for exercise. He denies any unsteadiness or issues with balance. He is highly functional and independent in daily activities. He does not have a living will on file. Patient denies any depression or any cognitive changes. He is up to date on his dental and vision checkups. He is monitoring a well balanced diet.    Blood pressure remains elevated today. No headaches or chest pain. No blood pressure medications currently.  Previously home blood pressure readings have been consistently within normal.     HISTORY       Recent Hospitalizations:    Recent hospitalization?:     If YES, location, date, and diagnoses:     Location:   Date:   Principle Discharge Dx:   Secondary Dx:       Patient Care Team:    Patient Care Team:  Kd Howard MD as  PCP - General (Internal Medicine)  Dionisio Kay MD as Consulting Physician (Urology)  Ronak Cobos PA (Physician Assistant)  Sudeep Mares MD as Consulting Physician (Ophthalmology)      Allergies:  Tramadol and Triazolam    Medications:  Current Outpatient Medications on File Prior to Visit   Medication Sig Dispense Refill    cholecalciferol (VITAMIN D3) 1000 units tablet Take 1 tablet by mouth Daily.      dutasteride (AVODART) 0.5 MG capsule Take 1 capsule by mouth Daily.      fenofibrate (TRICOR) 145 MG tablet TAKE 1 TABLET BY MOUTH EVERY DAY 90 tablet 3    omeprazole (priLOSEC) 20 MG capsule TAKE 1 CAPSULE BY MOUTH EVERY DAY 90 capsule 3    simvastatin (ZOCOR) 20 MG tablet TAKE 1 TABLET BY MOUTH EVERY DAY 90 tablet 3    tamsulosin (FLOMAX) 0.4 MG capsule 24 hr capsule Take 0.8 mg daily  3     No current facility-administered medications on file prior to visit.        PFSH:     The following portions of the patient's history were reviewed and updated as appropriate: Allergies / Current Medications / Past Medical History / Surgical History / Social History / Family History    Problem List:  Patient Active Problem List   Diagnosis    White coat syndrome without hypertension    Gastroesophageal reflux disease    Hyperlipidemia    BPH without urinary obstruction       Past Medical History:  Past Medical History:   Diagnosis Date    Acid reflux     Actinic keratoses 1/28/2019    [facility]    Arthritis     Benign prostatic hyperplasia 1995    Diverticulitis of large intestine without perforation or abscess without bleeding 11/9/2017    Diverticulosis 2012    Diverticulosis of large intestine 12/7/2016    Per colonoscopy 2012-Dr. Torres.    Hyperlipidemia     Hypertension     Tremor 2015       Past Surgical History:  Past Surgical History:   Procedure Laterality Date    COLONOSCOPY  2012    WNL-- Diverticulosis-- Dr Torres    COLONOSCOPY N/A 12/7/2017    Procedure: COLONOSCOPY;  Surgeon: Joe ALBRIGHT  "MD Melissa;  Location: [facility] ENDOSCOPY;  Service:     CYSTOSCOPY Bilateral 2017    Dr. Kay-urology-\"normal\".    ENDOSCOPY N/A 2017    Procedure: ESOPHAGOGASTRODUODENOSCOPY;  Surgeon: Joe Torres MD;  Location: [facility] ENDOSCOPY;  Service:     UPPER GASTROINTESTINAL ENDOSCOPY  2004    Joe Torres MD       Social History:  Social History     Socioeconomic History    Marital status:      Spouse name: Kay    Number of children: 2   Tobacco Use    Smoking status: Former     Packs/day: 1.00     Years: 10.00     Pack years: 10.00     Types: Cigarettes     Quit date: 1975     Years since quittin.5    Smokeless tobacco: Never   Vaping Use    Vaping Use: Never used   Substance and Sexual Activity    Alcohol use: No    Drug use: No    Sexual activity: Yes     Partners: Female       Family History:  Family History   Problem Relation Age of Onset    Arthritis Mother     Hyperlipidemia Mother     Stroke Mother     Vision loss Mother     Dementia Mother     Hypertension Mother     COPD Father     Cancer Father         Bladder    Hyperlipidemia Sister     Hyperlipidemia Sister     COPD Brother     Alcohol abuse Brother     Drug abuse Brother     Early death Brother     Liver disease Brother     Hypertension Daughter     No Known Problems Daughter     Colon cancer Neg Hx     Prostate cancer Neg Hx          PATIENT ASSESSMENT     Vitals:  Vitals:    23 1250   BP: 170/90   Pulse: 63   Temp: 97.3 °F (36.3 °C)   SpO2: 98%   Weight: 77.1 kg (170 lb)   Height: 170.2 cm (67\")       BP Readings from Last 3 Encounters:   23 170/90   23 174/88   22 138/72     Wt Readings from Last 3 Encounters:   23 77.1 kg (170 lb)   23 76.7 kg (169 lb)   22 76.3 kg (168 lb 4.8 oz)      Body mass index is 26.63 kg/m².    Blood pressure readings recorded on patient flowsheet:       No data to display                    Review of Systems:    Review of " Systems  Constitutional neg except per HPI   Resp neg  CV neg  GI negative   negative  Neuro negative      Physical Exam:    Physical Exam  General: No acute distress  Psych: Normal thought and judgment   Cardiovascular Rate: normal. Rhythm: regular. Heart sounds: normal   Pulm/Chest: Effort normal, breath sounds normal.  No carotid bruit.      Reviewed Data:    Labs:   Lab Results   Component Value Date     01/26/2023    K 4.0 01/26/2023    CALCIUM 9.4 01/26/2023    AST 21 01/26/2023    ALT 13 01/26/2023    BUN 16 01/26/2023    CREATININE 1.05 01/26/2023    CREATININE 1.21 01/24/2022    CREATININE 1.13 03/03/2021    EGFRIFNONA 59 (L) 01/24/2022    EGFRIFAFRI 69 01/24/2022       No results found for: GLU, HGBA1C, MICROALBUR    Lab Results   Component Value Date    LDL 91 01/26/2023    LDL 95 01/24/2022    LDL 89 03/03/2021    HDL 56 01/26/2023    TRIG 94 01/26/2023    CHOLHDLRATIO 2.93 01/26/2023       Lab Results   Component Value Date    TSH 1.730 01/25/2018    FREET4 1.04 01/25/2018          Lab Results   Component Value Date    WBC 4.8 01/24/2022    HGB 14.6 01/24/2022    HGB 13.2 (L) 10/05/2017     01/24/2022                   Lab Results   Component Value Date    PSA 0.855 12/07/2016    PSA 0.90 11/20/2015       Imaging:          Medical Tests:          Screening for Glaucoma:  Previous screening for glaucoma?:   [x] YES  [] NO  []     Hearing Loss Screen:  Finger Rub Hearing Test (right ear):   [x] PASSED  [] FAILED  [] BORDERLINE  [] HAS HEARING AID  [] USING HEARING AID  [] NOT USING HEARING AID  []     Finger Rub Hearing Test (left ear):   [x] PASSED  [] FAILED  [] BORDERLINE  [] HAS HEARING AID  [] USING HEARING AID  [] NOT USING HEARING AID  []     Urinary Incontinence Screen:  Episodes of urinary incontinence? :  [] YES  [x] NO  [] N/A  [] WILL NEED FOLLOWUP  []       Depression Screen:      7/26/2023    12:55 PM   PHQ-2/PHQ-9 Depression Screening   Little Interest or Pleasure in Doing  Things 0-->not at all   Feeling Down, Depressed or Hopeless 0-->not at all   PHQ-9: Brief Depression Severity Measure Score 0        PHQ-2:   [x] 0 -      NOT DEPRESSED  [] 1 -      NOT DEPRESSED  [] 2 -      NOT DEPRESSED  [] 3-6 -  DEPRESSED (PROCEED TO PHQ-9)  [] N/A - HAS DEPRESSION AND IS ON TREATMENT  [] N/A - HAS DEPRESSION AND IS NOT ON TREATMENT    PHQ-9:   [x] 0         NEGATIVE SCREENING FOR DEPRESSION  [] 1-4      MINIMAL DEPRESSION  [] 5-9      MILD DEPRESSIONNOT DEPRESSED  [] 10-14  MODERATE DEPRESSION  [] 15-19  MODERATELY SEVERE DEPRESSION  [] 20-27  SEVERE DEPRESSION    FUNCTIONAL, FALL RISK, & COGNITIVE SCREENING (Components below):    DATA:        7/26/2023    12:55 PM   Functional & Cognitive Status   Do you have difficulty preparing food and eating? No   Do you have difficulty bathing yourself, getting dressed or grooming yourself? No   Do you have difficulty using the toilet? No   Do you have difficulty moving around from place to place? No   Do you have trouble with steps or getting out of a bed or a chair? No   Current Diet Well Balanced Diet   Dental Exam Up to date   Eye Exam Up to date   Exercise (times per week) 3 times per week   Current Exercises Include Stationary Bicycling/Spin Class;Bicycling Outdoors;Walking;Light Weights   Do you need help using the phone?  No   Are you deaf or do you have serious difficulty hearing?  No   Do you need help to go to places out of walking distance? No   Do you need help shopping? No   Do you need help preparing meals?  No   Do you need help with housework?  No   Do you need help with laundry? No   Do you need help taking your medications? No   Do you need help managing money? No   Do you ever drive or ride in a car without wearing a seat belt? No   Do you have difficulty concentrating, remembering or making decisions? No         A) Assessment of Functional Ability:  (Assessment of ability to perform ADL's (showering/bathing, using toilet, dressing,  feeding self, moving self around) and IADL's (use telephone, shop, prepare food, housekeep, do laundry, transport independently, take medications independently, and handle finances)    Degree of functional impairment: (based on assessment noted above)  [x] NONE  [] MILD  [] MODERATE  [] SEVERE  [] VERY SEVERE  []     B) Assessment of Fall Risk:  [x] LOW  [] MODERATE  [] HIGH  [] VERY HIGH     Need for further evaluation of gait, strength, and balance? :  [] YES  [] NO    Timed Up and Go (TUG):   (>= 12 seconds indicates high risk for falling)    Observable abnormalities included:  [x] NORMAL GAIT   [] SLOW CAUTIOUS PACE  [] IMPAIRED BALANCE  [] SHORT STRIDES  [] MINIMAL ARM SWING  [] UNSTEADY (MILD)  [] UNSTEADY (MODERATE)  [] UNSTEADY (SEVERE)  [] SHUFFLING GAIT  [] USES ASSISTIVE DEVICE (CANE) PROPERLY  [] USES ASSISTIVE DEVICE (WALKER) PROPERLY  [] USES ASSISTIVE DEVICE (CANE) PROPERLY  [] DOES NOT USE ASSISTIVE DEVICE PROPERLY  [] IN WHEELCHAIR   [] NOT ABLE TO BE TESTED DUE TO SAFETY CONCERNS  []     C. Assessment of Cognitive Function:    Mini-Cog Test:     1) Registration (3 objects):     [x] YES  [] NO   [] N/A HAS DOCUMENTED DEMENTIA     2) Number of objects recalled:   [x] 3  [] 2  [] 1  [] 0     3) Clock Draw: Passed? :   [] YES  [] NO   [x] N/A  [] N/A HAS DOCUMENTED DEMENTIA     Further evaluation required? :   [] YES  [x] NO   [] CLOSE OBSERVATION NEEDED   [] SCHEDULE APPOINTMENT TO EVALUATE FURTHER   [] CONTINUE REGULAR FOLLOWUP AND MANAGEMENT   []     COUNSELING       A. Identification of Health Risk Factors:    Risk factors include: cardiovascular risk factors      B. Age-Appropriate Screening Schedule:  (Refer to the list below for future screening recommendations based on patient's age, sex and/or medical conditions. Orders for these recommended tests are listed in the plan section. The patient has been provided with a written plan)    Health Maintenance Topics  Health Maintenance   Topic Date Due     COVID-19 Vaccine (3 - Moderna series) 12/31/2023 (Originally 4/17/2021)    Pneumococcal Vaccine 65+ (2 - PPSV23 or PCV20) 12/31/2024 (Originally 9/11/2019)    INFLUENZA VACCINE  10/01/2023    LIPID PANEL  01/26/2024    ANNUAL WELLNESS VISIT  07/26/2024    COLORECTAL CANCER SCREENING  12/07/2027    TDAP/TD VACCINES (2 - Td or Tdap) 07/29/2029    HEPATITIS C SCREENING  Completed    AAA SCREEN (ONE-TIME)  Completed    ZOSTER VACCINE  Completed       Health Maintenance Topics Due or Over-Due  There are no preventive care reminders to display for this patient.      C. Advanced Care Planning:    Advance Care Planning   ACP discussion was held with the patient during this visit. Patient does not have an advance directive, information provided.       D. Patient Self-Management and Personalized Health Advice:    He has been provided with personalized counseling/information (including brochures/handouts) about:     -- reducing risk for cardiovascular disease (heart, stroke, vascular), designing advance directives, and designating POA      He has been recommended for the following preventative services which has been performed today, will be ordered today or ordered/performed on upcoming follow-up visit:     -- NUTRITION counseling provided, EXERCISE counseling provided, EYE exam for glaucoma screening recommended, CARDIOVASCULAR disease risk reduction counseling performed, FALL RISK assessment / plan of care completed, URINARY incontinence assessment done, VASCULAR screening recommended (including AAA screening), ISCHEMIC HEART DISEASE screening (CCT or stress test), screening for prostate cancer (discussed and followed/managed by urologist), **COLORECTAL cancer screening (colonoscopy/Cologuard discussed or ordered), vaccination for PNEUMOVAX/PCV administered (or recommended), COVID-19 vaccination (and/or booster) recommendations provided, DIABETES screening performed (current/reviewed labs/lab ordered)      E.  Miscellaneous Items:    -Aspirin use counseling: Does not need ASA (and currently is not on it)    -Discussed BMI with him. The BMI is above average; BMI management plan is completed (discussed plans for weight loss)    -Reviewed use of high risk medication in the elderly: YES    -Reviewed for potential of harmful drug interactions in the elderly: YES        WRAP UP       Assessment & Plan:    1) MEDICARE ANNUAL WELLNESS VISIT    2) OTHER MEDICAL CONDITIONS ADDRESSED TODAY:            Problem List Items Addressed This Visit          High    White coat syndrome without hypertension (Chronic)    Overview     Maintain low-sodium diet and continue to monitor blood pressure at home.         Current Assessment & Plan     BP Readings from Last 3 Encounters:   07/26/23 170/90   01/26/23 174/88   07/26/22 138/72      Blood pressure is high again today. Will have him monitor home blood pressure and send readings in 2 weeks. May need medication.             Medium    Gastroesophageal reflux disease (Chronic)    Overview     Continue omeprazole 20 mg daily.          Relevant Medications    omeprazole (priLOSEC) 20 MG capsule    Hyperlipidemia (Chronic)    Overview     Continue simvastatin 20 mg and fenofibrate 145mg tablet daily          Relevant Medications    fenofibrate (TRICOR) 145 MG tablet    simvastatin (ZOCOR) 20 MG tablet       Low    BPH without urinary obstruction (Chronic)    Overview     Urologist     Continue follow-up with urologist.          Other Visit Diagnoses       Medicare annual wellness visit, initial    -  Primary                    No orders of the defined types were placed in this encounter.      Discussion / Summary:        Medications as of TODAY:              Current Outpatient Medications   Medication Sig Dispense Refill    cholecalciferol (VITAMIN D3) 1000 units tablet Take 1 tablet by mouth Daily.      dutasteride (AVODART) 0.5 MG capsule Take 1 capsule by mouth Daily.      fenofibrate (TRICOR)  145 MG tablet TAKE 1 TABLET BY MOUTH EVERY DAY 90 tablet 3    omeprazole (priLOSEC) 20 MG capsule TAKE 1 CAPSULE BY MOUTH EVERY DAY 90 capsule 3    simvastatin (ZOCOR) 20 MG tablet TAKE 1 TABLET BY MOUTH EVERY DAY 90 tablet 3    tamsulosin (FLOMAX) 0.4 MG capsule 24 hr capsule Take 0.8 mg daily  3     No current facility-administered medications for this visit.         FOLLOW-UP:            Return in about 6 months (around 1/26/2024) for Reassess chronic medical problems.              Future Appointments   Date Time Provider Department Center   1/29/2024  9:45 AM Kd Howard MD [facility]           After Visit Summary (AVS) including the Personalized Prevention  Plan Services (PPPS) was either printed and given to the patient at check-out today and/or sent to [facility] for review.       Transcribed from ambient dictation for Kd Howard MD by Alessia Mann.  07/26/23   15:36 EDT    Patient or patient representative verbalized consent to the visit recording.  I have personally performed the services described in this document as transcribed by the above individual, and it is both accurate and complete.  Kd Howard MD  7/26/2023  17:12 EDT

## 2023-07-26 NOTE — ASSESSMENT & PLAN NOTE
BP Readings from Last 3 Encounters:   07/26/23 170/90   01/26/23 174/88   07/26/22 138/72      Blood pressure is high again today. Will have him monitor home blood pressure and send readings in 2 weeks. May need medication.

## 2023-07-27 ENCOUNTER — TELEPHONE (OUTPATIENT)
Dept: INTERNAL MEDICINE | Age: 73
End: 2023-07-27
Payer: MEDICARE

## 2023-07-27 NOTE — TELEPHONE ENCOUNTER
----- Message from Kd Howard MD sent at 7/26/2023  5:09 PM EDT -----  Regarding: CALL PATIENT regarding high bp in office  Call patient:    His blood pressure was elevated again in office day.   Send home blood pressure readings in 2 weeks (IMPORTANT).  MAY NEED MEDICATION IF PERSISTENTLY HIGH.   Maintain low sodium diet of less than 3 grams daily.

## 2023-08-26 DIAGNOSIS — K21.9 GASTROESOPHAGEAL REFLUX DISEASE: ICD-10-CM

## 2023-08-26 DIAGNOSIS — E78.2 MIXED HYPERLIPIDEMIA: ICD-10-CM

## 2023-08-28 RX ORDER — OMEPRAZOLE 20 MG/1
CAPSULE, DELAYED RELEASE ORAL
Qty: 90 CAPSULE | Refills: 0 | Status: SHIPPED | OUTPATIENT
Start: 2023-08-28

## 2023-08-28 RX ORDER — FENOFIBRATE 145 MG/1
TABLET, COATED ORAL
Qty: 90 TABLET | Refills: 0 | Status: SHIPPED | OUTPATIENT
Start: 2023-08-28

## 2023-08-28 RX ORDER — SIMVASTATIN 20 MG
TABLET ORAL
Qty: 90 TABLET | Refills: 0 | Status: SHIPPED | OUTPATIENT
Start: 2023-08-28

## 2023-11-25 DIAGNOSIS — K21.9 GASTROESOPHAGEAL REFLUX DISEASE: ICD-10-CM

## 2023-11-25 DIAGNOSIS — E78.2 MIXED HYPERLIPIDEMIA: ICD-10-CM

## 2023-11-27 RX ORDER — SIMVASTATIN 20 MG
TABLET ORAL
Qty: 90 TABLET | Refills: 1 | Status: SHIPPED | OUTPATIENT
Start: 2023-11-27

## 2023-11-27 RX ORDER — FENOFIBRATE 145 MG/1
TABLET, COATED ORAL
Qty: 90 TABLET | Refills: 1 | Status: SHIPPED | OUTPATIENT
Start: 2023-11-27

## 2023-11-27 RX ORDER — OMEPRAZOLE 20 MG/1
CAPSULE, DELAYED RELEASE ORAL
Qty: 90 CAPSULE | Refills: 1 | Status: SHIPPED | OUTPATIENT
Start: 2023-11-27

## 2024-01-29 ENCOUNTER — OFFICE VISIT (OUTPATIENT)
Dept: INTERNAL MEDICINE | Age: 74
End: 2024-01-29
Payer: MEDICARE

## 2024-01-29 VITALS
HEART RATE: 60 BPM | DIASTOLIC BLOOD PRESSURE: 100 MMHG | OXYGEN SATURATION: 98 % | BODY MASS INDEX: 27.15 KG/M2 | TEMPERATURE: 96.9 F | HEIGHT: 67 IN | WEIGHT: 173 LBS | SYSTOLIC BLOOD PRESSURE: 180 MMHG

## 2024-01-29 DIAGNOSIS — R03.0 WHITE COAT SYNDROME WITHOUT HYPERTENSION: Primary | Chronic | ICD-10-CM

## 2024-01-29 DIAGNOSIS — Z13.6 ENCOUNTER FOR SCREENING FOR VASCULAR DISEASE: ICD-10-CM

## 2024-01-29 DIAGNOSIS — E78.2 MIXED HYPERLIPIDEMIA: Chronic | ICD-10-CM

## 2024-01-29 PROCEDURE — G2211 COMPLEX E/M VISIT ADD ON: HCPCS | Performed by: INTERNAL MEDICINE

## 2024-01-29 PROCEDURE — 1160F RVW MEDS BY RX/DR IN RCRD: CPT | Performed by: INTERNAL MEDICINE

## 2024-01-29 PROCEDURE — 1159F MED LIST DOCD IN RCRD: CPT | Performed by: INTERNAL MEDICINE

## 2024-01-29 PROCEDURE — 99214 OFFICE O/P EST MOD 30 MIN: CPT | Performed by: INTERNAL MEDICINE

## 2024-01-29 NOTE — PROGRESS NOTES
I N T E R N A L  M E D I C I N E    J U N O H  K I M,  M D      ENCOUNTER DATE:  01/29/2024    Juan F Sanz / 73 y.o. / male    CHIEF COMPLAINT / REASON FOR OFFICE VISIT     White coat syndrome without hypertension, Gastroesophageal reflux disease, and Hyperlipidemia      ASSESSMENT & PLAN     Problem List Items Addressed This Visit       White coat syndrome without hypertension - Primary (Chronic)    Overview     Maintain low-sodium diet and continue to monitor blood pressure at home.         Current Assessment & Plan     BP Readings from Last 3 Encounters:   01/29/24 180/100   07/26/23 170/90   01/26/23 174/88      Blood pressure is quite high here abut again very normal at home (reviewed home blood pressure readings).     Blood pressure machine has been verified for accuracy by medical assistant previously.     Advised to bring blood pressure machine for me to verify next time. Check blood pressure twice weekly/consistently.     Triple vessel screening recommended. Ordered.          Relevant Orders    Comprehensive Metabolic Panel    Microalbumin / Creatinine Urine Ratio - Urine, Clean Catch    Hyperlipidemia (Chronic)    Overview     Continue simvastatin 20 mg and fenofibrate 145mg tablet daily          Relevant Medications    simvastatin (ZOCOR) 20 MG tablet    fenofibrate (TRICOR) 145 MG tablet    Other Relevant Orders    Comprehensive Metabolic Panel    Lipid Panel With / Chol / HDL Ratio     Other Visit Diagnoses       Encounter for screening for vascular disease        Relevant Orders    Vascular Screening (Bundle) CAR          Orders Placed This Encounter   Procedures    Comprehensive Metabolic Panel    Lipid Panel With / Chol / HDL Ratio    Microalbumin / Creatinine Urine Ratio - Urine, Clean Catch     No orders of the defined types were placed in this encounter.      SUMMARY/DISCUSSION        Next Appointment with me: Visit date not found    Return in about 6 months (around 7/29/2024) for Reassess  "chronic medical problems.      VITAL SIGNS     Vitals:    01/29/24 0943 01/29/24 1027   BP: (!) 186/108 180/100   Pulse: 60    Temp: 96.9 °F (36.1 °C)    SpO2: 98%    Weight: 78.5 kg (173 lb)    Height: 170.2 cm (67\")        BP Readings from Last 3 Encounters:   01/29/24 180/100   07/26/23 170/90   01/26/23 174/88     Wt Readings from Last 3 Encounters:   01/29/24 78.5 kg (173 lb)   07/26/23 77.1 kg (170 lb)   01/26/23 76.7 kg (169 lb)     Body mass index is 27.1 kg/m².    Blood pressure readings recorded on patient flowsheet:       No data to display                  MEDICATIONS AT THE TIME OF OFFICE VISIT     Current Outpatient Medications on File Prior to Visit   Medication Sig    cholecalciferol (VITAMIN D3) 1000 units tablet Take 1 tablet by mouth Daily.    dutasteride (AVODART) 0.5 MG capsule Take 1 capsule by mouth Daily.    fenofibrate (TRICOR) 145 MG tablet TAKE 1 TABLET BY MOUTH EVERY DAY    omeprazole (priLOSEC) 20 MG capsule TAKE 1 CAPSULE BY MOUTH EVERY DAY    simvastatin (ZOCOR) 20 MG tablet TAKE 1 TABLET BY MOUTH EVERY DAY    tamsulosin (FLOMAX) 0.4 MG capsule 24 hr capsule Take 0.8 mg daily     No current facility-administered medications on file prior to visit.          HISTORY OF PRESENT ILLNESS     Juan F reports he is doing well overall.     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 Dr. Billingsley is his dermatologist. Tomorrow, 01/30/2024, he is scheduled to have a basal cell carcinoma removed from his nose by Dr. Joaquin Olivera.    He confirms he is taking omeprazole, fenofibrate, simvastatin, Avodart, and tamsulosin.    His blood pressure is elevated today at 186/100 mmHg. At home, blood pressure readings for January is consistently < 135/85 on his log of home blood pressure readings. Previously his home blood pressure machine was verified for accuracy.  It was found to be accurate. He has white coat syndrome. His mother had hypertension, dementia, and was almost 90 years of " "age when she suffered a stroke. His daughter has hypertension. The patient quit smoking in 1975, and rarely drinks alcohol.    He denies unusual headaches, chest pain, or blurred vision.    The patient still sees his urologist yearly for prostate related matters. His PSA is checked annually. He is unsure if his urine has been checked for microscopic protein.    Patient has not had a vascular screen study performed. He is not interested in a cardiac CT calcium score test at this time.      Patient Care Team:  Kd Howard MD as PCP - General (Internal Medicine)  Dionisio Kay MD as Consulting Physician (Urology)  Ronak Cobos PA (Physician Assistant)  Sudeep Mares MD as Consulting Physician (Ophthalmology)  Joaquin Olivera MD (Dermatology)  Bryan Billingsley MD as Referring Physician (Dermatology)    REVIEW OF SYSTEMS     Review of Systems   Constitutional neg except per HPI   Resp neg  CV neg   Neuro negative   Vision negative for change    PHYSICAL EXAMINATION     Physical Exam  General: No acute distress  Psych: Normal thought and judgment   Cardiovascular Rate: normal. Rhythm: regular. Heart sounds: normal   No carotid bruit   No lower extremity edema       REVIEWED DATA     Labs:     Lab Results   Component Value Date     01/26/2023    K 4.0 01/26/2023    CALCIUM 9.4 01/26/2023    AST 21 01/26/2023    ALT 13 01/26/2023    BUN 16 01/26/2023    CREATININE 1.05 01/26/2023    CREATININE 1.21 01/24/2022    CREATININE 1.13 03/03/2021    EGFRIFNONA 59 (L) 01/24/2022    EGFRIFAFRI 69 01/24/2022       No results found for: \"HGBA1C\"    Lab Results   Component Value Date    LDL 91 01/26/2023    LDL 95 01/24/2022    LDL 89 03/03/2021    HDL 56 01/26/2023    HDL 52 01/24/2022    TRIG 94 01/26/2023    TRIG 124 01/24/2022       Lab Results   Component Value Date    TSH 1.730 01/25/2018    FREET4 1.04 01/25/2018       Lab Results   Component Value Date    WBC 4.8 01/24/2022    HGB 14.6 01/24/2022    " " 01/24/2022       No results found for: \"MALBCRERATIO\"         Imaging:           Medical Tests:           Summary of old records / correspondence / consultant report:           Request outside records:         Transcribed from ambient dictation for Kd Howard MD by Kiya Ansari.  01/29/24   11:18 EST    Patient or patient representative verbalized consent to the visit recording.  I have personally performed the services described in this document as transcribed by the above individual, and it is both accurate and complete.  Kd Howard MD  1/29/2024  17:59 EST      "

## 2024-01-29 NOTE — ASSESSMENT & PLAN NOTE
BP Readings from Last 3 Encounters:   01/29/24 180/100   07/26/23 170/90   01/26/23 174/88      Blood pressure is quite high here abut again very normal at home (reviewed home blood pressure readings).     Blood pressure machine has been verified for accuracy by medical assistant previously.     Advised to bring blood pressure machine for me to verify next time. Check blood pressure twice weekly/consistently.     Triple vessel screening recommended. Ordered.

## 2024-01-30 LAB
ALBUMIN SERPL-MCNC: 4.8 G/DL (ref 3.5–5.2)
ALBUMIN/CREAT UR: <4 MG/G CREAT (ref 0–29)
ALBUMIN/GLOB SERPL: 2.3 G/DL
ALP SERPL-CCNC: 41 U/L (ref 39–117)
ALT SERPL-CCNC: 20 U/L (ref 1–41)
AST SERPL-CCNC: 26 U/L (ref 1–40)
BILIRUB SERPL-MCNC: 0.4 MG/DL (ref 0–1.2)
BUN SERPL-MCNC: 18 MG/DL (ref 8–23)
BUN/CREAT SERPL: 19.1 (ref 7–25)
CALCIUM SERPL-MCNC: 9.8 MG/DL (ref 8.6–10.5)
CHLORIDE SERPL-SCNC: 109 MMOL/L (ref 98–107)
CHOLEST SERPL-MCNC: 159 MG/DL (ref 0–200)
CHOLEST/HDLC SERPL: 2.89 {RATIO}
CO2 SERPL-SCNC: 27.5 MMOL/L (ref 22–29)
CREAT SERPL-MCNC: 0.94 MG/DL (ref 0.76–1.27)
CREAT UR-MCNC: 68.7 MG/DL
EGFRCR SERPLBLD CKD-EPI 2021: 85.1 ML/MIN/1.73
GLOBULIN SER CALC-MCNC: 2.1 GM/DL
GLUCOSE SERPL-MCNC: 87 MG/DL (ref 65–99)
HDLC SERPL-MCNC: 55 MG/DL (ref 40–60)
LDLC SERPL CALC-MCNC: 87 MG/DL (ref 0–100)
MICROALBUMIN UR-MCNC: <3 UG/ML
POTASSIUM SERPL-SCNC: 4.7 MMOL/L (ref 3.5–5.2)
PROT SERPL-MCNC: 6.9 G/DL (ref 6–8.5)
SODIUM SERPL-SCNC: 146 MMOL/L (ref 136–145)
TRIGL SERPL-MCNC: 91 MG/DL (ref 0–150)
VLDLC SERPL CALC-MCNC: 17 MG/DL (ref 5–40)

## 2024-05-07 ENCOUNTER — HOSPITAL ENCOUNTER (OUTPATIENT)
Dept: CARDIOLOGY | Facility: HOSPITAL | Age: 74
Discharge: HOME | End: 2024-05-07
Admitting: INTERNAL MEDICINE

## 2024-05-07 VITALS
WEIGHT: 168 LBS | DIASTOLIC BLOOD PRESSURE: 88 MMHG | BODY MASS INDEX: 27 KG/M2 | HEIGHT: 66 IN | HEART RATE: 53 BPM | SYSTOLIC BLOOD PRESSURE: 160 MMHG

## 2024-05-07 DIAGNOSIS — Z13.6 ENCOUNTER FOR SCREENING FOR VASCULAR DISEASE: ICD-10-CM

## 2024-05-07 LAB
BH CV ECHO MEAS - DIST AO DIAM: 1.52 CM
BH CV VAS BP LEFT ARM: NORMAL MMHG
BH CV VAS BP RIGHT ARM: NORMAL MMHG
BH CV VAS SCREENING CAROTID CCA LEFT: NORMAL CM/SEC
BH CV VAS SCREENING CAROTID CCA RIGHT: NORMAL CM/SEC
BH CV VAS SCREENING CAROTID ICA LEFT: NORMAL CM/SEC
BH CV VAS SCREENING CAROTID ICA RIGHT: NORMAL CM/SEC
BH CV XLRA MEAS - MID AO DIAM: 1.68 CM
BH CV XLRA MEAS - PAD LEFT ABI DP: 1.23
BH CV XLRA MEAS - PAD LEFT ABI PT: 1.28
BH CV XLRA MEAS - PAD LEFT ARM: 155 MMHG
BH CV XLRA MEAS - PAD LEFT LEG DP: 198 MMHG
BH CV XLRA MEAS - PAD LEFT LEG PT: 205 MMHG
BH CV XLRA MEAS - PAD RIGHT ABI DP: 1.21
BH CV XLRA MEAS - PAD RIGHT ABI PT: 1.2
BH CV XLRA MEAS - PAD RIGHT ARM: 160 MMHG
BH CV XLRA MEAS - PAD RIGHT LEG DP: 194 MMHG
BH CV XLRA MEAS - PAD RIGHT LEG PT: 192 MMHG
BH CV XLRA MEAS - PROX AO DIAM: 2.02 CM
BH CV XLRA MEAS LEFT ICA/CCA RATIO: 1.14
BH CV XLRA MEAS LEFT PROX ECA PSV: NORMAL CM/SEC
BH CV XLRA MEAS RIGHT ICA/CCA RATIO: 1.09
BH CV XLRA MEAS RIGHT PROX ECA PSV: NORMAL CM/SEC
MAXIMAL PREDICTED HEART RATE: 145 BPM
STRESS TARGET HR: 123 BPM

## 2024-05-07 PROCEDURE — 93799 UNLISTED CV SVC/PROCEDURE: CPT

## 2024-05-19 DIAGNOSIS — E78.2 MIXED HYPERLIPIDEMIA: ICD-10-CM

## 2024-05-19 DIAGNOSIS — K21.9 GASTROESOPHAGEAL REFLUX DISEASE: ICD-10-CM

## 2024-05-20 DIAGNOSIS — E78.2 MIXED HYPERLIPIDEMIA: ICD-10-CM

## 2024-05-20 RX ORDER — FENOFIBRATE 145 MG/1
145 TABLET, COATED ORAL DAILY
Qty: 90 TABLET | Refills: 1 | Status: SHIPPED | OUTPATIENT
Start: 2024-05-20

## 2024-05-23 NOTE — TELEPHONE ENCOUNTER
"    Caller: Juan F Sanz \"Darshan\"    Relationship: Self    Best call back number: [phone]    Requested Prescriptions:   Requested Prescriptions     Pending Prescriptions Disp Refills    fenofibrate (TRICOR) 145 MG tablet [Pharmacy Med Name: Fenofibrate Oral Tablet 145 MG] 90 tablet 0     Sig: TAKE 1 TABLET BY MOUTH EVERY DAY    simvastatin (ZOCOR) 20 MG tablet [Pharmacy Med Name: Simvastatin Oral Tablet 20 MG] 90 tablet 0     Sig: TAKE 1 TABLET BY MOUTH EVERY DAY    omeprazole (priLOSEC) 20 MG capsule [Pharmacy Med Name: Omeprazole Oral Capsule Delayed Release 20 MG] 90 capsule 0     Sig: TAKE 1 CAPSULE BY MOUTH EVERY DAY        Pharmacy where request should be sent: [id number] - [place], [place] - [address] [phone] [facility] [phone] FX     Last office visit with prescribing clinician: 1/29/2024   Last telemedicine visit with prescribing clinician: Visit date not found   Next office visit with prescribing clinician: 5/20/2024     Additional details provided by patient: PATIENT IS CALLING TO CHECK ON THE STATUS OF THIS. PLEASE ADVISE.    Does the patient have less than a 3 day supply:  [x] Yes  [] No    Would you like a call back once the refill request has been completed: [] Yes [x] No    If the office needs to give you a call back, can they leave a voicemail: [] Yes [x] No    Kurt Espinosa Rep   05/23/24 14:04 EDT       "

## 2024-05-24 RX ORDER — OMEPRAZOLE 20 MG/1
CAPSULE, DELAYED RELEASE ORAL
Qty: 90 CAPSULE | Refills: 0 | Status: SHIPPED | OUTPATIENT
Start: 2024-05-24

## 2024-05-24 RX ORDER — SIMVASTATIN 20 MG
TABLET ORAL
Qty: 90 TABLET | Refills: 0 | Status: SHIPPED | OUTPATIENT
Start: 2024-05-24

## 2024-05-24 RX ORDER — FENOFIBRATE 145 MG/1
TABLET, COATED ORAL
Qty: 90 TABLET | Refills: 0 | OUTPATIENT
Start: 2024-05-24

## 2024-08-05 ENCOUNTER — OFFICE VISIT (OUTPATIENT)
Dept: INTERNAL MEDICINE | Age: 74
End: 2024-08-05
Payer: MEDICARE

## 2024-08-05 VITALS
DIASTOLIC BLOOD PRESSURE: 76 MMHG | BODY MASS INDEX: 27.29 KG/M2 | WEIGHT: 169.8 LBS | OXYGEN SATURATION: 97 % | HEART RATE: 58 BPM | TEMPERATURE: 97.8 F | HEIGHT: 66 IN | SYSTOLIC BLOOD PRESSURE: 121 MMHG

## 2024-08-05 DIAGNOSIS — R03.0 WHITE COAT SYNDROME WITHOUT HYPERTENSION: Primary | Chronic | ICD-10-CM

## 2024-08-05 DIAGNOSIS — K21.9 GASTROESOPHAGEAL REFLUX DISEASE, UNSPECIFIED WHETHER ESOPHAGITIS PRESENT: Chronic | ICD-10-CM

## 2024-08-05 DIAGNOSIS — E78.2 MIXED HYPERLIPIDEMIA: Chronic | ICD-10-CM

## 2024-08-05 PROCEDURE — G2211 COMPLEX E/M VISIT ADD ON: HCPCS | Performed by: NURSE PRACTITIONER

## 2024-08-05 PROCEDURE — 1126F AMNT PAIN NOTED NONE PRSNT: CPT | Performed by: NURSE PRACTITIONER

## 2024-08-05 PROCEDURE — 99214 OFFICE O/P EST MOD 30 MIN: CPT | Performed by: NURSE PRACTITIONER

## 2024-08-05 RX ORDER — CLOBETASOL PROPIONATE 0.5 MG/ML
SOLUTION TOPICAL
COMMUNITY
Start: 2024-06-06

## 2024-08-05 NOTE — PROGRESS NOTES
"    I N T E R N A L  M E D I C I N E  LILIANE MOON, APRN      ENCOUNTER DATE:  08/05/2024    Juan F Sanz / 74 y.o. / male      CHIEF COMPLAINT / REASON FOR OFFICE VISIT     Hyperlipidemia and WHITE COAT SYNDROME WITHOUT HYPERTENSION       ASSESSMENT & PLAN     Problem List Items Addressed This Visit       White coat syndrome without hypertension - Primary (Chronic)    Overview     Maintain low-sodium diet and continue to monitor blood pressure at home.         Gastroesophageal reflux disease (Chronic)    Overview     Continue omeprazole 20 mg daily.          Relevant Medications    omeprazole (priLOSEC) 20 MG capsule    Hyperlipidemia (Chronic)    Overview     Continue simvastatin 20 mg and fenofibrate 145mg tablet daily          Relevant Medications    simvastatin (ZOCOR) 20 MG tablet    fenofibrate (TRICOR) 145 MG tablet     No orders of the defined types were placed in this encounter.    No orders of the defined types were placed in this encounter.      SUMMARY/DISCUSSION  We have verified his blood pressure Omron cuff with manual readings today.  Blood pressure cuff in office 170s over 100s; Omron low 180/100s.  All blood pressures at home ranging from 1 teens to 120s over 70s with outliers in the 130s systolic.  No diagnosis of hypertension, white coat hypertension only.  Will follow-up in 6 months and complete fasting labs at that time for cholesterol.  Declines vaccinations today.    Next Appointment with me: Visit date not found    Return in about 6 months (around 2/5/2025) for Next scheduled follow up.      VITAL SIGNS     Visit Vitals  /76 Comment: home reading   Pulse 58   Temp 97.8 °F (36.6 °C) (Temporal)   Ht 167.6 cm (66\")   Wt 77 kg (169 lb 12.8 oz)   SpO2 97%   BMI 27.41 kg/m²     Wt Readings from Last 3 Encounters:   08/05/24 77 kg (169 lb 12.8 oz)   05/07/24 76.2 kg (168 lb)   01/29/24 78.5 kg (173 lb)     Body mass index is 27.41 kg/m².    MEDICATIONS AT THE TIME OF OFFICE VISIT "     Current Outpatient Medications on File Prior to Visit   Medication Sig    cholecalciferol (VITAMIN D3) 1000 units tablet Take 1 tablet by mouth Daily.    clobetasol (TEMOVATE) 0.05 % external solution APPLY TO SCALP EVERY OTHER DAY    dutasteride (AVODART) 0.5 MG capsule Take 1 capsule by mouth Daily.    fenofibrate (TRICOR) 145 MG tablet Take 1 tablet by mouth Daily.    omeprazole (priLOSEC) 20 MG capsule TAKE 1 CAPSULE BY MOUTH EVERY DAY    simvastatin (ZOCOR) 20 MG tablet TAKE 1 TABLET BY MOUTH EVERY DAY    tamsulosin (FLOMAX) 0.4 MG capsule 24 hr capsule Take 0.8 mg daily     No current facility-administered medications on file prior to visit.      HISTORY OF PRESENT ILLNESS     Patient presents for 6-month follow-up.  Seen last January 29, 2024 with PCP Dr. Howard.      Seen at that time for white coat syndrome without hypertension, GERD and hyperlipidemia.  Monitors blood pressure at home with stable benign readings.  Blood pressure machine has been verified for accuracy.  No chest pain, shortness of breath, blurred vision.    Urologist yearly for prostate, PSA performed through their office along with a urine.    Vascular screening bundle was also ordered at last office visit and within normal. hyperlipidemia on simvastatin 20 and fenofibrate 145 mg daily.  No elevations in liver enzymes and last LDL 87 and triglycerides 91.    GERD controlled with omeprazole 20 mg.  Endoscopy 2017 normal.    Patient was seen on January 30, 2024 for basal cell carcinoma with Dr. Joaquin Olivera.    Colonoscopy in 2017 with recall in 10 years.    REVIEW OF SYSTEMS     Constitutional neg except per HPI   Resp neg  CV neg     PHYSICAL EXAMINATION     Physical Exam  Constitutional  No distress  Cardiovascular Rate  normal . Rhythm: regular . Heart sounds:  normal  Pulmonary/Chest  Effort normal. Breath sounds:  normal  Psychiatric  Alert. Judgment and thought content normal. Mood normal        REVIEWED DATA     Labs:   Lab Results  "  Component Value Date    GLUCOSE 87 01/29/2024    BUN 18 01/29/2024    CREATININE 0.94 01/29/2024    EGFRRESULT 85.1 01/29/2024    BCR 19.1 01/29/2024    K 4.7 01/29/2024    CO2 27.5 01/29/2024    CALCIUM 9.8 01/29/2024    PROTENTOTREF 6.9 01/29/2024    ALBUMIN 4.8 01/29/2024    BILITOT 0.4 01/29/2024    AST 26 01/29/2024    ALT 20 01/29/2024     Lab Results   Component Value Date    WBC 4.8 01/24/2022    HGB 14.6 01/24/2022    HCT 43.9 01/24/2022    MCV 88 01/24/2022     01/24/2022     Lab Results   Component Value Date    CHLPL 159 01/29/2024    TRIG 91 01/29/2024    HDL 55 01/29/2024    LDL 87 01/29/2024      Lab Results   Component Value Date    TSH 1.730 01/25/2018     Brief Urine Lab Results  (Last result in the past 365 days)        Color   Clarity   Blood   Leuk Est   Nitrite   Protein   CREAT   Urine HCG        01/29/24 1034             68.7               No results found for: \"HGBA1C\"    Imaging:           Medical Tests:           Summary of old records / correspondence / consultant report:           Request outside records:     "

## 2024-08-26 DIAGNOSIS — E78.2 MIXED HYPERLIPIDEMIA: ICD-10-CM

## 2024-08-27 RX ORDER — SIMVASTATIN 20 MG
TABLET ORAL
Qty: 90 TABLET | Refills: 1 | Status: SHIPPED | OUTPATIENT
Start: 2024-08-27

## 2024-09-04 DIAGNOSIS — K21.9 GASTROESOPHAGEAL REFLUX DISEASE: ICD-10-CM

## 2024-09-04 NOTE — TELEPHONE ENCOUNTER
"    Caller: Juan F Sanz \"Darshan\"    Relationship: Self    Best call back number: [phone]     Requested Prescriptions:   Requested Prescriptions     Pending Prescriptions Disp Refills    omeprazole (priLOSEC) 20 MG capsule 90 capsule 0     Sig: Take 1 capsule by mouth Daily.        Pharmacy where request should be sent: [id number] - [place], [place] - [address] [phone] [facility] [phone] FX     Last office visit with prescribing clinician: 1/29/2024   Last telemedicine visit with prescribing clinician: Visit date not found   Next office visit with prescribing clinician: Visit date not found     Additional details provided by patient:     Does the patient have less than a 3 day supply:  [x] Yes  [] No    Would you like a call back once the refill request has been completed: [] Yes [x] No    If the office needs to give you a call back, can they leave a voicemail: [] Yes [x] No    Kurt Holder Rep   09/04/24 15:24 EDT        "

## 2024-11-20 DIAGNOSIS — E78.2 MIXED HYPERLIPIDEMIA: ICD-10-CM

## 2024-11-22 RX ORDER — FENOFIBRATE 145 MG/1
145 TABLET, COATED ORAL DAILY
Qty: 90 TABLET | Refills: 0 | Status: SHIPPED | OUTPATIENT
Start: 2024-11-22

## 2025-02-04 ENCOUNTER — OFFICE VISIT (OUTPATIENT)
Dept: INTERNAL MEDICINE | Age: 75
End: 2025-02-04
Payer: MEDICARE

## 2025-02-04 VITALS
BODY MASS INDEX: 27.74 KG/M2 | OXYGEN SATURATION: 99 % | HEART RATE: 64 BPM | HEIGHT: 66 IN | SYSTOLIC BLOOD PRESSURE: 168 MMHG | DIASTOLIC BLOOD PRESSURE: 86 MMHG | WEIGHT: 172.6 LBS

## 2025-02-04 DIAGNOSIS — E78.2 MIXED HYPERLIPIDEMIA: Chronic | ICD-10-CM

## 2025-02-04 DIAGNOSIS — R03.0 WHITE COAT SYNDROME WITHOUT HYPERTENSION: Primary | Chronic | ICD-10-CM

## 2025-02-04 LAB
ALBUMIN SERPL-MCNC: 4.4 G/DL (ref 3.5–5.2)
ALBUMIN/GLOB SERPL: 1.8 G/DL
ALP SERPL-CCNC: 42 U/L (ref 39–117)
ALT SERPL-CCNC: 18 U/L (ref 1–41)
AST SERPL-CCNC: 26 U/L (ref 1–40)
BASOPHILS # BLD AUTO: 0.03 10*3/MM3 (ref 0–0.2)
BASOPHILS NFR BLD AUTO: 0.6 % (ref 0–1.5)
BILIRUB SERPL-MCNC: 0.5 MG/DL (ref 0–1.2)
BUN SERPL-MCNC: 17 MG/DL (ref 8–23)
BUN/CREAT SERPL: 13.8 (ref 7–25)
CALCIUM SERPL-MCNC: 10.2 MG/DL (ref 8.6–10.5)
CHLORIDE SERPL-SCNC: 107 MMOL/L (ref 98–107)
CHOLEST SERPL-MCNC: 161 MG/DL (ref 0–200)
CHOLEST/HDLC SERPL: 3.22 {RATIO}
CO2 SERPL-SCNC: 27.9 MMOL/L (ref 22–29)
CREAT SERPL-MCNC: 1.23 MG/DL (ref 0.76–1.27)
EGFRCR SERPLBLD CKD-EPI 2021: 61.2 ML/MIN/1.73
EOSINOPHIL # BLD AUTO: 0.16 10*3/MM3 (ref 0–0.4)
EOSINOPHIL NFR BLD AUTO: 3.2 % (ref 0.3–6.2)
ERYTHROCYTE [DISTWIDTH] IN BLOOD BY AUTOMATED COUNT: 12.2 % (ref 12.3–15.4)
GLOBULIN SER CALC-MCNC: 2.4 GM/DL
GLUCOSE SERPL-MCNC: 92 MG/DL (ref 65–99)
HCT VFR BLD AUTO: 43.9 % (ref 37.5–51)
HDLC SERPL-MCNC: 50 MG/DL (ref 40–60)
HGB BLD-MCNC: 13.9 G/DL (ref 13–17.7)
IMM GRANULOCYTES # BLD AUTO: 0.02 10*3/MM3 (ref 0–0.05)
IMM GRANULOCYTES NFR BLD AUTO: 0.4 % (ref 0–0.5)
LDLC SERPL CALC-MCNC: 92 MG/DL (ref 0–100)
LYMPHOCYTES # BLD AUTO: 1.56 10*3/MM3 (ref 0.7–3.1)
LYMPHOCYTES NFR BLD AUTO: 31.1 % (ref 19.6–45.3)
MCH RBC QN AUTO: 28.7 PG (ref 26.6–33)
MCHC RBC AUTO-ENTMCNC: 31.7 G/DL (ref 31.5–35.7)
MCV RBC AUTO: 90.5 FL (ref 79–97)
MONOCYTES # BLD AUTO: 0.43 10*3/MM3 (ref 0.1–0.9)
MONOCYTES NFR BLD AUTO: 8.6 % (ref 5–12)
NEUTROPHILS # BLD AUTO: 2.81 10*3/MM3 (ref 1.7–7)
NEUTROPHILS NFR BLD AUTO: 56.1 % (ref 42.7–76)
NRBC BLD AUTO-RTO: 0 /100 WBC (ref 0–0.2)
PLATELET # BLD AUTO: 207 10*3/MM3 (ref 140–450)
POTASSIUM SERPL-SCNC: 4.7 MMOL/L (ref 3.5–5.2)
PROT SERPL-MCNC: 6.8 G/DL (ref 6–8.5)
RBC # BLD AUTO: 4.85 10*6/MM3 (ref 4.14–5.8)
SODIUM SERPL-SCNC: 145 MMOL/L (ref 136–145)
T4 FREE SERPL-MCNC: 1.02 NG/DL (ref 0.92–1.68)
TRIGL SERPL-MCNC: 105 MG/DL (ref 0–150)
TSH SERPL DL<=0.005 MIU/L-ACNC: 1.91 UIU/ML (ref 0.27–4.2)
VLDLC SERPL CALC-MCNC: 19 MG/DL (ref 5–40)
WBC # BLD AUTO: 5.01 10*3/MM3 (ref 3.4–10.8)

## 2025-02-04 PROCEDURE — 99214 OFFICE O/P EST MOD 30 MIN: CPT | Performed by: NURSE PRACTITIONER

## 2025-02-04 PROCEDURE — G2211 COMPLEX E/M VISIT ADD ON: HCPCS | Performed by: NURSE PRACTITIONER

## 2025-02-04 PROCEDURE — 1126F AMNT PAIN NOTED NONE PRSNT: CPT | Performed by: NURSE PRACTITIONER

## 2025-02-04 NOTE — PROGRESS NOTES
"    I N T E R N A L  M E D I C I N E  LILIANE MOON, APRN      ENCOUNTER DATE:  02/04/2025    Juan F Sanz / 74 y.o. / male      CHIEF COMPLAINT / REASON FOR OFFICE VISIT     WHITE COAT SYNDROME WITHOUT HYPERTENSION    and Hyperlipidemia      ASSESSMENT & PLAN     Problem List Items Addressed This Visit       White coat syndrome without hypertension - Primary (Chronic)    Overview     Maintain low-sodium diet and continue to monitor blood pressure at home.         Hyperlipidemia (Chronic)    Overview     Continue simvastatin 20 mg and fenofibrate 145mg tablet daily          Relevant Medications    simvastatin (ZOCOR) 20 MG tablet    fenofibrate (TRICOR) 145 MG tablet    Other Relevant Orders    Comprehensive Metabolic Panel    Lipid Panel With / Chol / HDL Ratio    CBC & Differential    TSH+Free T4     Orders Placed This Encounter   Procedures    Comprehensive Metabolic Panel    Lipid Panel With / Chol / HDL Ratio    TSH+Free T4    CBC & Differential     No orders of the defined types were placed in this encounter.      SUMMARY/DISCUSSION  Patient will continue to monitor blood pressures at home routinely.  Within normal range.  Continue current medication regimen for hyperlipidemia.  Declines vaccinations today    Next Appointment with me: Visit date not found    Return in about 6 months (around 8/4/2025) for Medicare Wellness.      VITAL SIGNS     Visit Vitals  /86   Pulse 64   Ht 167.6 cm (66\")   Wt 78.3 kg (172 lb 9.6 oz)   SpO2 99%   BMI 27.86 kg/m²       Wt Readings from Last 3 Encounters:   02/04/25 78.3 kg (172 lb 9.6 oz)   08/05/24 77 kg (169 lb 12.8 oz)   05/07/24 76.2 kg (168 lb)     Body mass index is 27.86 kg/m².      MEDICATIONS AT THE TIME OF OFFICE VISIT     Current Outpatient Medications on File Prior to Visit   Medication Sig    cholecalciferol (VITAMIN D3) 1000 units tablet Take 1 tablet by mouth Daily.    dutasteride (AVODART) 0.5 MG capsule Take 1 capsule by mouth Daily.    fenofibrate " (TRICOR) 145 MG tablet Take 1 tablet by mouth Daily.    omeprazole (priLOSEC) 20 MG capsule Take 1 capsule by mouth Daily.    simvastatin (ZOCOR) 20 MG tablet TAKE 1 TABLET BY MOUTH EVERY DAY    tamsulosin (FLOMAX) 0.4 MG capsule 24 hr capsule Take 0.8 mg daily    clobetasol (TEMOVATE) 0.05 % external solution APPLY TO SCALP EVERY OTHER DAY     No current facility-administered medications on file prior to visit.      HISTORY OF PRESENT ILLNESS     White coat syndrome without hypertension, GERD and hyperlipidemia.  Monitors blood pressure at home with stable benign readings.  Blood pressure machine has been verified for accuracy.  No chest pain, shortness of breath, blurred vision.  Blood pressure readings at home in the 1 teens to 120s over 70s.     Urologist yearly for prostate, PSA performed through their office along with a urine.  Prescribed finasteride and Flomax through their office.     Vascular screening bundle was also ordered at last office visit and within normal. hyperlipidemia on simvastatin 20 and fenofibrate 145 mg daily.  No elevations in liver enzymes and last LDL 87 and triglycerides 91.     GERD controlled with omeprazole 20 mg.  Endoscopy 2017 normal.     Patient was seen on January 30, 2024 for basal cell carcinoma with Dr. Joaquin Olivera.     Colonoscopy in 2017 with recall in 10 years.    REVIEW OF SYSTEMS     Constitutional neg except per HPI   Resp neg  CV neg     PHYSICAL EXAMINATION     Physical Exam  Constitutional  No distress  Cardiovascular Rate  normal . Rhythm: regular . Heart sounds:  normal  Pulmonary/Chest  Effort normal. Breath sounds:  normal  Psychiatric  Alert. Judgment and thought content normal. Mood normal      REVIEWED DATA     Labs:   Lab Results   Component Value Date    GLUCOSE 87 01/29/2024    BUN 18 01/29/2024    CREATININE 0.94 01/29/2024    EGFRRESULT 85.1 01/29/2024    BCR 19.1 01/29/2024    K 4.7 01/29/2024    CO2 27.5 01/29/2024    CALCIUM 9.8 01/29/2024     "PROTENTOTREF 6.9 01/29/2024    ALBUMIN 4.8 01/29/2024    BILITOT 0.4 01/29/2024    AST 26 01/29/2024    ALT 20 01/29/2024     Lab Results   Component Value Date    WBC 4.8 01/24/2022    HGB 14.6 01/24/2022    HCT 43.9 01/24/2022    MCV 88 01/24/2022     01/24/2022     Lab Results   Component Value Date    CHLPL 159 01/29/2024    TRIG 91 01/29/2024    HDL 55 01/29/2024    LDL 87 01/29/2024      Lab Results   Component Value Date    TSH 1.730 01/25/2018     Brief Urine Lab Results       None          No results found for: \"HGBA1C\"    Imaging:           Medical Tests:           Summary of old records / correspondence / consultant report:           Request outside records:     "

## 2025-02-20 DIAGNOSIS — K21.9 GASTROESOPHAGEAL REFLUX DISEASE: ICD-10-CM

## 2025-02-20 DIAGNOSIS — E78.2 MIXED HYPERLIPIDEMIA: ICD-10-CM

## 2025-02-20 NOTE — TELEPHONE ENCOUNTER
"    Caller: Juan F Sanz \"Darshan\"    Relationship: Self    Best call back number: [phone]    Requested Prescriptions:   Requested Prescriptions     Pending Prescriptions Disp Refills    fenofibrate (TRICOR) 145 MG tablet 90 tablet 0     Sig: Take 1 tablet by mouth Daily.    omeprazole (priLOSEC) 20 MG capsule 90 capsule 1     Sig: Take 1 capsule by mouth Daily.    simvastatin (ZOCOR) 20 MG tablet 90 tablet 1     Sig: Take 1 tablet by mouth Daily.        Pharmacy where request should be sent: [id number] [place] [address] [facility] [phone] [facility] [phone] FX     Last office visit with prescribing clinician: 1/29/2024   Last telemedicine visit with prescribing clinician: Visit date not found   Next office visit with prescribing clinician: 8/5/2025     Additional details provided by patient:     Does the patient have less than a 3 day supply:  [] Yes  [x] No        Kurt Sauer   02/20/25 15:42 EST           "

## 2025-02-21 RX ORDER — SIMVASTATIN 20 MG
20 TABLET ORAL DAILY
Qty: 90 TABLET | Refills: 1 | Status: SHIPPED | OUTPATIENT
Start: 2025-02-21

## 2025-02-21 RX ORDER — FENOFIBRATE 145 MG/1
145 TABLET, COATED ORAL DAILY
Qty: 90 TABLET | Refills: 1 | Status: SHIPPED | OUTPATIENT
Start: 2025-02-21

## 2025-07-07 ENCOUNTER — TELEPHONE (OUTPATIENT)
Dept: INTERNAL MEDICINE | Age: 75
End: 2025-07-07
Payer: MEDICARE

## 2025-07-07 NOTE — TELEPHONE ENCOUNTER
"Caller: Juan F Sanz \"Darshan\"    Relationship: Self    Best call back number: [phone]     Who is your current provider: DR VELAZCO    Is your current provider offboarding? NO    Who would you like your new provider to be: LILIANE MOON    What are your reasons for transferring care: JUST BEEN SEEING HER MORE LATELY, PREFERS TO SEE HER    Additional notes: PLEASE REVIEW 8.5 APPT SCHEDULED WITH DR VELAZCO, AND RESCHEDULE WITH LILIANE IF POSSIBLE. PLEASE CALL PATIENT TO UPDATE AND DISCUSS AS NEEDED.     "

## 2025-07-08 NOTE — TELEPHONE ENCOUNTER
Attempted to call patient. LVM. Patient will have to establish care formally with Ke. I took an appointment for him on 8/7 at 1130am. That is her first available. He has appt on 8/5 with Dr. Howard that if he agrees to the new patient appointment can be cancelled. I took her first available. If the patient calls back and that doesn't work for him, we will have to find another 1130am slot that is open for Ke that does NOT already have an new patient scheduled for that day. Should patient call back, please advise he must establish care formally through a new patient appt with Ke. He can keep AWV with Dr. Howard if he chooses but that can also be cancelled and r/s to a later date after establishing with Ke.

## 2025-07-09 NOTE — TELEPHONE ENCOUNTER
"Name: Juan F Sanz \"Darshan\"      Relationship: Self      Best Callback Number: [phone]       HUB PROVIDED THE RELAY MESSAGE FROM THE OFFICE      PATIENT: VOICED UNDERSTANDING AND HAS NO FURTHER QUESTIONS AT THIS TIME    ADDITIONAL INFORMATION:   "

## 2025-08-07 ENCOUNTER — OFFICE VISIT (OUTPATIENT)
Dept: INTERNAL MEDICINE | Age: 75
End: 2025-08-07
Payer: MEDICARE

## 2025-08-07 VITALS
BODY MASS INDEX: 26.93 KG/M2 | DIASTOLIC BLOOD PRESSURE: 84 MMHG | OXYGEN SATURATION: 99 % | HEART RATE: 52 BPM | HEIGHT: 66 IN | TEMPERATURE: 96.8 F | SYSTOLIC BLOOD PRESSURE: 158 MMHG | WEIGHT: 167.6 LBS

## 2025-08-07 DIAGNOSIS — R03.0 WHITE COAT SYNDROME WITHOUT HYPERTENSION: Primary | Chronic | ICD-10-CM

## 2025-08-07 DIAGNOSIS — L98.9 SKIN LESIONS: ICD-10-CM

## 2025-08-07 DIAGNOSIS — E78.2 MIXED HYPERLIPIDEMIA: Chronic | ICD-10-CM

## 2025-08-07 DIAGNOSIS — N40.0 BENIGN PROSTATIC HYPERPLASIA, UNSPECIFIED WHETHER LOWER URINARY TRACT SYMPTOMS PRESENT: ICD-10-CM

## 2025-08-07 DIAGNOSIS — K21.9 GASTROESOPHAGEAL REFLUX DISEASE, UNSPECIFIED WHETHER ESOPHAGITIS PRESENT: Chronic | ICD-10-CM

## 2025-08-18 DIAGNOSIS — K21.9 GASTROESOPHAGEAL REFLUX DISEASE: ICD-10-CM

## 2025-08-18 DIAGNOSIS — E78.2 MIXED HYPERLIPIDEMIA: ICD-10-CM

## 2025-08-19 RX ORDER — OMEPRAZOLE 20 MG/1
20 CAPSULE, DELAYED RELEASE ORAL DAILY
Qty: 90 CAPSULE | Refills: 1 | Status: SHIPPED | OUTPATIENT
Start: 2025-08-19

## 2025-08-19 RX ORDER — FENOFIBRATE 145 MG/1
145 TABLET, FILM COATED ORAL DAILY
Qty: 90 TABLET | Refills: 1 | Status: SHIPPED | OUTPATIENT
Start: 2025-08-19

## 2025-08-19 RX ORDER — SIMVASTATIN 20 MG
20 TABLET ORAL DAILY
Qty: 90 TABLET | Refills: 1 | Status: SHIPPED | OUTPATIENT
Start: 2025-08-19

## (undated) DEVICE — THE TORRENT IRRIGATION SCOPE CONNECTOR IS USED WITH THE TORRENT IRRIGATION TUBING TO PROVIDE IRRIGATION FLUIDS SUCH AS STERILE WATER DURING GASTROINTESTINAL ENDOSCOPIC PROCEDURES WHEN USED IN CONJUNCTION WITH AN IRRIGATION PUMP (OR ELECTROSURGICAL UNIT).: Brand: TORRENT

## (undated) DEVICE — TUBING, SUCTION, 1/4" X 10', STRAIGHT: Brand: MEDLINE

## (undated) DEVICE — Device: Brand: DEFENDO AIR/WATER/SUCTION AND BIOPSY VALVE

## (undated) DEVICE — BITEBLOCK OMNI BLOC

## (undated) DEVICE — CANN NASL CO2 TRULINK W/O2 A/

## (undated) DEVICE — TBG 02 CRUSH RESIST LF CLR 7FT